# Patient Record
Sex: MALE | Race: WHITE | Employment: UNEMPLOYED | URBAN - NONMETROPOLITAN AREA
[De-identification: names, ages, dates, MRNs, and addresses within clinical notes are randomized per-mention and may not be internally consistent; named-entity substitution may affect disease eponyms.]

---
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
## 2019-10-15 ENCOUNTER — HOSPITAL ENCOUNTER (EMERGENCY)
Age: 19
Discharge: HOME | End: 2019-10-15
Attending: EMERGENCY MEDICINE
Payer: COMMERCIAL

## 2019-10-15 ENCOUNTER — APPOINTMENT (OUTPATIENT)
Dept: GENERAL RADIOLOGY | Age: 19
End: 2019-10-15
Payer: COMMERCIAL

## 2019-10-15 VITALS
HEART RATE: 75 BPM | BODY MASS INDEX: 21.47 KG/M2 | RESPIRATION RATE: 15 BRPM | OXYGEN SATURATION: 99 % | TEMPERATURE: 98.3 F | HEIGHT: 70 IN | DIASTOLIC BLOOD PRESSURE: 80 MMHG | SYSTOLIC BLOOD PRESSURE: 123 MMHG | WEIGHT: 150 LBS

## 2019-10-15 DIAGNOSIS — Z72.0 TOBACCO ABUSE: ICD-10-CM

## 2019-10-15 DIAGNOSIS — I30.9 ACUTE PERICARDITIS, UNSPECIFIED TYPE: Primary | ICD-10-CM

## 2019-10-15 DIAGNOSIS — R05.9 COUGH: ICD-10-CM

## 2019-10-15 LAB
ALBUMIN SERPL-MCNC: 4.1 GM/DL (ref 3.4–5)
ALP BLD-CCNC: 73 IU/L (ref 40–129)
ALT SERPL-CCNC: 11 U/L (ref 10–40)
ANION GAP SERPL CALCULATED.3IONS-SCNC: 5 MMOL/L (ref 4–16)
AST SERPL-CCNC: 16 IU/L (ref 15–37)
BASOPHILS ABSOLUTE: 0.1 K/CU MM
BASOPHILS RELATIVE PERCENT: 0.4 % (ref 0–1)
BILIRUB SERPL-MCNC: 0.4 MG/DL (ref 0–1)
BUN BLDV-MCNC: 9 MG/DL (ref 6–23)
CALCIUM SERPL-MCNC: 8.4 MG/DL (ref 8.3–10.6)
CHLORIDE BLD-SCNC: 102 MMOL/L (ref 99–110)
CO2: 28 MMOL/L (ref 21–32)
CREAT SERPL-MCNC: 0.9 MG/DL (ref 0.9–1.3)
DIFFERENTIAL TYPE: ABNORMAL
EOSINOPHILS ABSOLUTE: 0.1 K/CU MM
EOSINOPHILS RELATIVE PERCENT: 0.9 % (ref 0–3)
GFR AFRICAN AMERICAN: >60 ML/MIN/1.73M2
GFR NON-AFRICAN AMERICAN: >60 ML/MIN/1.73M2
GLUCOSE BLD-MCNC: 111 MG/DL (ref 70–99)
HCT VFR BLD CALC: 48.6 % (ref 42–52)
HEMOGLOBIN: 15.9 GM/DL (ref 13.5–18)
IMMATURE NEUTROPHIL %: 0.4 % (ref 0–0.43)
LYMPHOCYTES ABSOLUTE: 1.5 K/CU MM
LYMPHOCYTES RELATIVE PERCENT: 10.8 % (ref 24–44)
MCH RBC QN AUTO: 28.2 PG (ref 27–31)
MCHC RBC AUTO-ENTMCNC: 32.7 % (ref 32–36)
MCV RBC AUTO: 86.3 FL (ref 78–100)
MONOCYTES ABSOLUTE: 1 K/CU MM
MONOCYTES RELATIVE PERCENT: 7.1 % (ref 0–4)
NUCLEATED RBC %: 0 %
PDW BLD-RTO: 12.1 % (ref 11.7–14.9)
PLATELET # BLD: 229 K/CU MM (ref 140–440)
PMV BLD AUTO: 10 FL (ref 7.5–11.1)
POTASSIUM SERPL-SCNC: 4 MMOL/L (ref 3.5–5.1)
RBC # BLD: 5.63 M/CU MM (ref 4.6–6.2)
SEGMENTED NEUTROPHILS ABSOLUTE COUNT: 11.3 K/CU MM
SEGMENTED NEUTROPHILS RELATIVE PERCENT: 80.4 % (ref 36–66)
SODIUM BLD-SCNC: 135 MMOL/L (ref 135–145)
TOTAL IMMATURE NEUTOROPHIL: 0.06 K/CU MM
TOTAL NUCLEATED RBC: 0 K/CU MM
TOTAL PROTEIN: 6.8 GM/DL (ref 6.4–8.2)
TROPONIN T: <0.01 NG/ML
WBC # BLD: 14 K/CU MM (ref 4–10.5)

## 2019-10-15 PROCEDURE — 93010 ELECTROCARDIOGRAM REPORT: CPT | Performed by: INTERNAL MEDICINE

## 2019-10-15 PROCEDURE — 84484 ASSAY OF TROPONIN QUANT: CPT

## 2019-10-15 PROCEDURE — 80053 COMPREHEN METABOLIC PANEL: CPT

## 2019-10-15 PROCEDURE — 6370000000 HC RX 637 (ALT 250 FOR IP): Performed by: EMERGENCY MEDICINE

## 2019-10-15 PROCEDURE — 85025 COMPLETE CBC W/AUTO DIFF WBC: CPT

## 2019-10-15 PROCEDURE — 93005 ELECTROCARDIOGRAM TRACING: CPT | Performed by: PHYSICIAN ASSISTANT

## 2019-10-15 PROCEDURE — 99285 EMERGENCY DEPT VISIT HI MDM: CPT

## 2019-10-15 PROCEDURE — 71045 X-RAY EXAM CHEST 1 VIEW: CPT

## 2019-10-15 PROCEDURE — 36415 COLL VENOUS BLD VENIPUNCTURE: CPT

## 2019-10-15 RX ORDER — IBUPROFEN 600 MG/1
600 TABLET ORAL ONCE
Status: COMPLETED | OUTPATIENT
Start: 2019-10-15 | End: 2019-10-15

## 2019-10-15 RX ORDER — IBUPROFEN 600 MG/1
600 TABLET ORAL EVERY 6 HOURS PRN
Qty: 30 TABLET | Refills: 0 | Status: SHIPPED | OUTPATIENT
Start: 2019-10-15

## 2019-10-15 RX ORDER — INDOMETHACIN 50 MG/1
50 CAPSULE ORAL 3 TIMES DAILY
Qty: 42 CAPSULE | Refills: 0 | Status: SHIPPED | OUTPATIENT
Start: 2019-10-15 | End: 2019-10-29

## 2019-10-15 RX ORDER — GUAIFENESIN 100 MG/5ML
200 SOLUTION ORAL ONCE
Status: COMPLETED | OUTPATIENT
Start: 2019-10-15 | End: 2019-10-15

## 2019-10-15 RX ADMIN — GUAIFENESIN 200 MG: 200 SOLUTION ORAL at 09:24

## 2019-10-15 RX ADMIN — IBUPROFEN 600 MG: 600 TABLET ORAL at 09:24

## 2019-10-15 ASSESSMENT — PAIN DESCRIPTION - PAIN TYPE: TYPE: ACUTE PAIN

## 2019-10-15 ASSESSMENT — PAIN DESCRIPTION - LOCATION: LOCATION: CHEST

## 2019-10-15 ASSESSMENT — PAIN DESCRIPTION - FREQUENCY: FREQUENCY: INTERMITTENT

## 2019-10-15 ASSESSMENT — PAIN DESCRIPTION - DESCRIPTORS: DESCRIPTORS: ACHING

## 2019-10-15 ASSESSMENT — PAIN SCALES - GENERAL: PAINLEVEL_OUTOF10: 9

## 2019-10-17 LAB
EKG ATRIAL RATE: 67 BPM
EKG DIAGNOSIS: NORMAL
EKG P AXIS: 54 DEGREES
EKG P-R INTERVAL: 140 MS
EKG Q-T INTERVAL: 370 MS
EKG QRS DURATION: 98 MS
EKG QTC CALCULATION (BAZETT): 390 MS
EKG R AXIS: 92 DEGREES
EKG T AXIS: 72 DEGREES
EKG VENTRICULAR RATE: 67 BPM

## 2020-02-20 ENCOUNTER — HOSPITAL ENCOUNTER (OUTPATIENT)
Dept: PSYCHIATRY | Age: 20
Setting detail: THERAPIES SERIES
Discharge: HOME | End: 2020-02-20
Payer: COMMERCIAL

## 2020-02-20 PROCEDURE — 90791 PSYCH DIAGNOSTIC EVALUATION: CPT

## 2020-02-20 PROCEDURE — 80305 DRUG TEST PRSMV DIR OPT OBS: CPT

## 2020-02-20 NOTE — PROGRESS NOTES
[address] [place] [address] Urinalysis Laboratory Testing and Medical History      Location: [x] [place] [] Nasreen Jin MD., [id number], Medical Director of [facility] SURGICAL [facility] Director orders for [address] [place] [address] clinical therapists to collect an urine sample from:      Client: Gordon Schwab   : 2000   Case# 1      Urine sample will be collected following the collections guidelines provided on [facility] [place]) custody form, and completion of the [facility] Non-Federal chain of custody drug screening form. During the course of treatment, randomly a urine sample will be collected, at a minimum of one time a month, more frequently as needed, as part of the clinical outpatient alcohol and drug treatment program at [address] [place] [address]. Physical Examination Recommended:  [x] NO           [] YES    Summary of Medical History  Prior to Admission medications    Medication Sig Start Date End Date Taking? Authorizing Provider   indomethacin (INDOCIN) 50 MG capsule Take 1 capsule by mouth 3 times daily for 14 days 10/15/19 10/29/19  Roxanna Jeronimo PA-C   ibuprofen (ADVIL;MOTRIN) 600 MG tablet Take 1 tablet by mouth every 6 hours as needed for Pain 10/15/19   Roxanna Jeronimo PA-C     Past Surgical History:   Procedure Laterality Date    HAND SURGERY      LEG SURGERY       History reviewed. No pertinent past medical history. There are no active problems to display for this patient.       GUILLERMO Simpson-FEDERICO  3638/6:76 PM

## 2020-02-20 NOTE — PROGRESS NOTES
[facility] TREATMENT PLAN    Location: [x] [place] [] Francis Nova    Treatment plan: Initial    Strengths: employed, work ethic      Weakness/Limitations: using on probation and interacting with using peers       Service/Frequency/Duration: Individual Session x 1 time weekly x 90 days, Standard Outpatient Group x 1 time weekly x 90 days, Urinalysis one time monthly x 90 days and one time weekly x 90 days A.A /  NA meetings     Diagnosis: F14.10 Nondependent cocaine abuse-unspecified use    Level of Care: 1 Outpatient Services    1. Problem: History of Substance use     a. Goal: Enhance personalized knowledge and insight associated with mood altering substances x 90 days   b. Objectives:     i. 1) Remind self of detrimental consequences in major life areas regarding substance  use in 90 days Evaluation Date: 5/20/2020 Code: C Continue TBD    ii. 2) Identify 4 to 8 benefits and gratitudes due to remaining substance free in 90 days: Evaluation Date: 05/20/2020 Code: C Continue TBD      iii. 3) Identify 4 relapse triggers, define relapse, difference between internal and eternal triggers associated with substance use in 90 days and Evaluation Date: 05/20/2020 Code: C Continue TBD      2. Problem: Involved in Legal System     a. Goal: Finalize and complete required stipulations and requirements for court in 90 days     b. Objectives:     i. 1) Participate actively in updated progress notes, urine screens for legal system in 90 days:  Evaluation Date: 05/20/2020 Code: C Continue TBD     ii. 2) Attend required meetings, court fines and other ramifications of probation, court in 90 days  Evaluation Date: 05/20/2020 Code: C Continue TBD     iii. 3)  Utilize, if needed case management services provided by [facility] to enhance abstaining from substance use Evaluation Date: 05/20/2020 Code: C Continue TBD       3. Problem: History of Relapse x 90 days   a.  Goal: Identify and address the core dynamics and dilemmas that are perpetuating consequences and exacerbate relapses and triggers and in 90 days       b. Objectives:   i. 1)  Identify and address 4 to 8 internal triggers and 4 to 8 external  triggers associated with substance  use in 90 days Evaluation Date: 05/20/2020 Code: C Continue TBD      ii. 2) Enhance 4 to 8 healthy techniques and coping skills, relapse prevention x 90 days  Evaluation Date: 05/20/2020 Code: C Continue TBD        Defer: Address legal stipulations       Discharge Plan/Instructions: Demonstrate constructive motivation to successfully complete outpatient treatment, finalize stipulations for probation and legal system and develop healthy sobriety plan. Alberto Guzmán / 2000 has participated in the treatment plan development outlined above on 2/20/2020.          Fransico Maciel [facility] Center[facility]ACQUES-CS  1/68/6970/5:84 PM

## 2020-02-27 ENCOUNTER — HOSPITAL ENCOUNTER (OUTPATIENT)
Dept: PSYCHIATRY | Age: 20
Setting detail: THERAPIES SERIES
Discharge: HOME | End: 2020-02-27
Payer: COMMERCIAL

## 2020-02-27 PROCEDURE — 90834 PSYTX W PT 45 MINUTES: CPT

## 2020-02-27 ASSESSMENT — LIFESTYLE VARIABLES: HISTORY_ALCOHOL_USE: NO

## 2020-02-27 NOTE — PROGRESS NOTES
612 [facility]        Individual  Progress Note    Location: [x] [place] [] [place]                   Patient Name: Ashley Schwartz   : 2000     Case # :  [id number]  Therapist: Gerry Merrill        Objective/Service/Time:      S: Gem Fernandez is a 20year old  male, no children, never , stipulated to complete substance abuse assessment through legal system due to continuous substance use of marijuana while on probation. O: Denies any homicidal and suicidal ideation: denies any psychosis, oriented x 4         A: Reviewed urine drug screen, completed remaining  psychosocial assessment, complete progress note and other pertinent and vital documentation essential for client to engage services. P: Establish adequate level of care and recommendations: consist of Individual sessions and outpatient group.          Electronically signed by Gerry Merrill on 2020 at 1:38 PM     Dayana Fenton, [facility], LICDC-CS

## 2020-03-03 ENCOUNTER — HOSPITAL ENCOUNTER (OUTPATIENT)
Dept: PSYCHIATRY | Age: 20
Setting detail: THERAPIES SERIES
Discharge: HOME | End: 2020-03-03
Payer: COMMERCIAL

## 2020-03-03 NOTE — GROUP NOTE
[facility] Group Therapy Note      3/3/2020    Location:  [place]    Clients Presents: 3622, 0233, 5851, 4680    Clients Absent:  2006, 4602    Length of session: 1.5 hours    Group Note: OP    Group Type: Co-Ed    New members were welcomed and introduced. Norms and expectations of group were discussed. Content:  Identifying and addressing signs and symptoms of toxic, abusive and dysfunctional relationships associated with internal and external triggers.        PETRA Ortega  9/2/1460 4:56 PM        Co-Therapist: N/A      [facility] Individual Group Progress Note    Chris Valentine  2000  3/3/2020    Notes on Client Progress in Group        Did not show for group session         PETRA Ortega  5/2/9540 4:41 PM         Co-Therapist: N/A

## 2020-03-05 ENCOUNTER — HOSPITAL ENCOUNTER (OUTPATIENT)
Dept: PSYCHIATRY | Age: 20
Setting detail: THERAPIES SERIES
Discharge: HOME | End: 2020-03-05
Payer: COMMERCIAL

## 2020-03-05 PROCEDURE — 90834 PSYTX W PT 45 MINUTES: CPT

## 2020-03-05 PROCEDURE — 80305 DRUG TEST PRSMV DIR OPT OBS: CPT

## 2020-03-05 NOTE — PROGRESS NOTES
[facility]RE[facility]    Location: [x] [place] [] [facility]    Treatment plan: Initial    Strengths: employed, work ethic      Weakness/Limitations: using on probation and interacting with using peers       Service/Frequency/Duration: Individual Session x 1 time weekly x 90 days, Standard Outpatient Group x 1 time weekly x 90 days, Urinalysis one time monthly x 90 days and one time weekly x 90 days A.A /  NA meetings     Diagnosis: F14.10 Nondependent cocaine abuse-unspecified use    Level of Care: 1 Outpatient Services    1. Problem: History of Substance use     a. Goal: Enhance personalized knowledge and insight associated with mood altering substances x 90 days   b. Objectives:     i. 1) Remind self of detrimental consequences in major life areas regarding substance  use in 90 days Evaluation Date: 5/20/2020 Code: Achieved reviewed major life areas and consequences. ii. 2) Identify 4 to 8 benefits and gratitudes due to remaining substance free in 90 days: Evaluation Date: 05/20/2020 Code: C Continue TBD      iii. 3) Identify 4 relapse triggers, define relapse, difference between internal and eternal triggers associated with substance use in 90 days and Evaluation Date: 05/20/2020 Code: C Continue TBD      2. Problem: Involved in Legal System     a. Goal: Finalize and complete required stipulations and requirements for court in 90 days     b. Objectives:     i. 1) Participate actively in updated progress notes, urine screens for legal system in 90 days:  Evaluation Date: 05/20/2020 Code: C Continue TBD     ii. 2) Attend required meetings, court fines and other ramifications of probation, court in 90 days  Evaluation Date: 05/20/2020 Code: C Continue TBD     iii. 3)  Utilize, if needed case management services provided by [facility] to enhance abstaining from substance use Evaluation Date: 05/20/2020 Code: C Continue TBD       3. Problem: History of Relapse x 90 days   a.  Goal: Identify and

## 2020-03-10 ENCOUNTER — HOSPITAL ENCOUNTER (OUTPATIENT)
Dept: PSYCHIATRY | Age: 20
Setting detail: THERAPIES SERIES
Discharge: HOME | End: 2020-03-10
Payer: COMMERCIAL

## 2020-03-10 PROCEDURE — 90853 GROUP PSYCHOTHERAPY: CPT

## 2020-03-10 NOTE — GROUP NOTE
612 [facility] Group Therapy Note      3/10/2020    Location:  [place]    Clients Presents: [address], [address] [facility], [facility] [address], [address] [facility], 0905, 9568    Clients Absent: 3390, 8595    Length of session: 1.5 hours    Group Note: OP    Group Type: Co-Ed    New members were welcomed and introduced. Norms and expectations of group were discussed. Content: Understanding the Disease Concept : progression of Use, tolerance, impact of substance use on major life areas; secondary consequences associated with substance use. PETRA Holt  4/39/5605 4:65 PM        Co-Therapist: N/A      [facility] Individual Group Progress Note    Eliseo Elam  2000  3/10/2020    Notes on Client Progress in [address] [place] [address] attended group, introduced himself and events leading to his arrival, discussed he has returned to treatment due to constantly smoking marijuana daily, currently involved in legal and financial consequences.         PETRA Holt  4/10/8654 9:69 PM          Co-Therapist: N/A

## 2020-03-12 ENCOUNTER — HOSPITAL ENCOUNTER (OUTPATIENT)
Dept: PSYCHIATRY | Age: 20
Setting detail: THERAPIES SERIES
Discharge: HOME | End: 2020-03-12
Payer: COMMERCIAL

## 2020-03-12 PROCEDURE — 90834 PSYTX W PT 45 MINUTES: CPT

## 2020-03-17 ENCOUNTER — APPOINTMENT (OUTPATIENT)
Dept: PSYCHIATRY | Age: 20
End: 2020-03-17
Payer: COMMERCIAL

## 2020-03-19 ENCOUNTER — HOSPITAL ENCOUNTER (OUTPATIENT)
Dept: PSYCHIATRY | Age: 20
Setting detail: THERAPIES SERIES
Discharge: HOME | End: 2020-03-19
Payer: COMMERCIAL

## 2020-03-19 PROCEDURE — 90834 PSYTX W PT 45 MINUTES: CPT

## 2020-03-19 NOTE — PROGRESS NOTES
[address] [facility]        Individual  Progress Note    Location: [x] [place] [] Justine [facility]abel                   Patient Name: Scot Collins   : 2000     Case # :  [id number]  Therapist: Myriam Leon        Objective/Service/Time:      Goal # 1     Objectives  # 3        S: Jose Guevara is a 20year old  male, no children, never , stipulated to complete substance abuse assessment through legal system due to continuous substance use of marijuana while on probation.         O:  oriented x 4            A: Processed relapse triggers, primarily associated with using peers, reviewed importance of mixed messages, adequate boundaries: reviewed thoughts and behaviros prior to use: currently motivated legally.                   P: Plan continue services         Electronically signed by Myriam Leon on 3/19/2020 at 11:55 AM     REINA Lara, VONW, LICDC-CS

## 2020-03-24 ENCOUNTER — APPOINTMENT (OUTPATIENT)
Dept: PSYCHIATRY | Age: 20
End: 2020-03-24
Payer: COMMERCIAL

## 2020-03-26 ENCOUNTER — HOSPITAL ENCOUNTER (OUTPATIENT)
Dept: PSYCHIATRY | Age: 20
Setting detail: THERAPIES SERIES
Discharge: HOME | End: 2020-03-26
Payer: COMMERCIAL

## 2020-03-26 PROCEDURE — 90834 PSYTX W PT 45 MINUTES: CPT

## 2020-03-26 NOTE — PROGRESS NOTES
612 [facility]        Individual  Progress Note    Location: [x] [place] [] [place]                   Patient Name: Jonane Patton   : 2000     Case # :  [id number]  Therapist: Bee Mccain        Objective/Service/Time:    Tele health     1. Reviewed with client to state their name and the last 4 numbers of his/her SS#   2. Reviewed with client  if they are in a confidential location where other people cannot hear the content of the counseling session, discuss the importance of confidentiality. 3.  Asked clients permission to conduct treatment individual counseling session via telehealth. 4.  Let the client know if you are disconnected that each one should try to call back until you make contact again. 5. They are to receive the crisis hotline phone numbers, they are located in the, Information You Need to Know packet, last page, and on their appointment cards for clients that were already active with [facility] prior.        Goal # 1  Objective # 3    S: Sera Conteh is a 20year old  male, no children, never , stipulated to complete substance abuse assessment through legal system due to continuous substance use of marijuana while on probation.      Discussed adjustment and acclimation of abstaining from substance use, identifying triggers and relapse.        O:  oriented x 4             A: Processed meaning of  relapse, difference between internal and external triggers, challenge mind when think of not using the rest of life, instead of one day at a time: majority of peers use, mainly more focused on working           P: Plan continue services        Electronically signed by Bee Mccain on 3/26/2020 at 5:09 PM       Dayana Alfonso 84, Autumn Westfall, [facility]-CS

## 2020-03-26 NOTE — PROGRESS NOTES
[facility]                     CLINICAL DIAGNOSIS SUMMARY    Location: [x] [place] [] [place]                   Patient Name: Rhonda Alfaro   : 2000     Case # :  [id number]  Therapist: Elvis Funez      1. Identifying information:  Rhonda Alfaro / 2000           Emery Alas is a 20year old Caucasians male, no children, legally stipulated to complete drug and alcohol assessment. 2.  Substance use history: Onset of smoking marijuana age 14, note while involved with [address] [place] [address] in , obtained positive urine for amphetamine on 2018, deluded urine 2018, in fact no human urine and urine on 2018 he falsified results using as he report his peers : report by age 12, primarily daily smoking, last use 2020, age 12 to 19 primarily daily use, several and multiple times per day. 3. Consequences of substance use: (personal, inter-personal, legal, occupational, medical, nutritional,       Leisure, spiritual, etc.)           Interpersonal majority of peers use, using activities and legal probation         4. Co-existing problems;  (mental health, psychiatric, previous treatment programs, family       Problems, social, educational, etc.)      Previous treatment at [address] [place] [address] and estranged relationship with father: alcoholic       5. Treatment needs, barriers to treatment, impact of disease on life: On probation, license suspended         Summary of Medical History:  Prior to Admission medications    Medication Sig Start Date End Date Taking?  Authorizing Provider   indomethacin (INDOCIN) 50 MG capsule Take 1 capsule by mouth 3 times daily for 14 days 10/15/19 10/29/19  Mirtha Nelson PA-C   ibuprofen (ADVIL;MOTRIN) 600 MG tablet Take 1 tablet by mouth every 6 hours as needed for Pain 10/15/19   Mirtha Nelson PA-C     Past Surgical History:   Procedure Laterality Date   

## 2020-03-26 NOTE — PROGRESS NOTES
[facility] TREATMENT [facility]    Location: [x] [place] [] [place]    Treatment plan: Initial    Strengths: employed, work ethic      Weakness/Limitations: using on probation and interacting with using peers       Service/Frequency/Duration: Individual Session x 1 time weekly x 90 days, Standard Outpatient Group x 1 time weekly x 90 days, Urinalysis one time monthly x 90 days and one time weekly x 90 days A.A /  NA meetings     Diagnosis: F14.10 Nondependent cocaine abuse-unspecified use    Level of Care: 1 Outpatient Services    1. Problem: History of Substance use     a. Goal: Enhance personalized knowledge and insight associated with mood altering substances x 90 days   b. Objectives:     i. 1) Remind self of detrimental consequences in major life areas regarding substance  use in 90 days Evaluation Date: 5/20/2020 Code: Achieved reviewed major life areas and consequences. ii. 2) Identify 4 to 8 benefits and gratitudes due to remaining substance free in 90 days: Evaluation Date: 05/20/2020 Code: C Continue TBD      iii. 3) Identify 4 relapse triggers, define relapse, difference between internal and eternal triggers associated with substance use in 90 days and Evaluation Date: 05/20/2020 Code: Achieved 3-26-20: reviewed cycle of relapsed and knowledge of relapse. 2.    Problem: Involved in Legal System     a. Goal: Finalize and complete required stipulations and requirements for court in 90 days     b. Objectives:     i. 1) Participate actively in updated progress notes, urine screens for legal system in 90 days:  Evaluation Date: 05/20/2020 Code: C Continue TBD     ii. 2) Attend required meetings, court fines and other ramifications of probation, court in 90 days  Evaluation Date: 05/20/2020 Code: C Continue TBD     iii. 3)  Utilize, if needed case management services provided by [facility] to enhance abstaining from substance use Evaluation Date: 05/20/2020 Code: C Continue TBD       3.     Problem:

## 2020-03-31 ENCOUNTER — APPOINTMENT (OUTPATIENT)
Dept: PSYCHIATRY | Age: 20
End: 2020-03-31
Payer: COMMERCIAL

## 2020-04-02 ENCOUNTER — HOSPITAL ENCOUNTER (OUTPATIENT)
Dept: PSYCHIATRY | Age: 20
Setting detail: THERAPIES SERIES
Discharge: HOME | End: 2020-04-02
Payer: COMMERCIAL

## 2020-04-02 PROCEDURE — 90832 PSYTX W PT 30 MINUTES: CPT

## 2020-04-02 NOTE — PROGRESS NOTES
[facility] TREATMENT [facility]    Location: [x] [place] [] [place]    Treatment plan: Initial    Strengths: employed, work ethic      Weakness/Limitations: using on probation and interacting with using peers       Service/Frequency/Duration: Individual Session x 1 time weekly x 90 days, Standard Outpatient Group x 1 time weekly x 90 days, Urinalysis one time monthly x 90 days and one time weekly x 90 days A.A /  NA meetings     Diagnosis: F14.10 Nondependent cocaine abuse-unspecified use    Level of Care: 1 Outpatient Services    1. Problem: History of Substance use     a. Goal: Enhance personalized knowledge and insight associated with mood altering substances x 90 days   b. Objectives:     i. 1) Remind self of detrimental consequences in major life areas regarding substance  use in 90 days Evaluation Date: 5/20/2020 Code: Achieved reviewed major life areas and consequences. ii. 2) Identify 4 to 8 benefits and gratitudes due to remaining substance free in 90 days: Evaluation Date: 05/20/2020 Code: Achieved 4-2-20: recognizing benefits of not smoking marijuana.       iii. 3) Identify 4 relapse triggers, define relapse, difference between internal and eternal triggers associated with substance use in 90 days and Evaluation Date: 05/20/2020 Code: Achieved 3-26-20: reviewed cycle of relapsed and knowledge of relapse. 2.    Problem: Involved in Legal System     a. Goal: Finalize and complete required stipulations and requirements for court in 90 days     b. Objectives:     i. 1) Participate actively in updated progress notes, urine screens for legal system in 90 days:  Evaluation Date: 05/20/2020 Code: C Continue TBD     ii. 2) Attend required meetings, court fines and other ramifications of probation, court in 90 days  Evaluation Date: 05/20/2020 Code: C Continue TBD     iii.  3)  Utilize, if needed case management services provided by [facility] to enhance abstaining from substance use Evaluation Date:

## 2020-04-09 ENCOUNTER — HOSPITAL ENCOUNTER (OUTPATIENT)
Dept: PSYCHIATRY | Age: 20
Setting detail: THERAPIES SERIES
Discharge: HOME | End: 2020-04-09
Payer: COMMERCIAL

## 2020-04-09 PROCEDURE — 90832 PSYTX W PT 30 MINUTES: CPT

## 2020-04-09 NOTE — PROGRESS NOTES
[facility] TREATMENT [facility]    Location: [x] [place] [] Rick Adame    Treatment plan: Initial    Strengths: employed, work ethic      Weakness/Limitations: using on probation and interacting with using peers       Service/Frequency/Duration: Individual Session x 1 time weekly x 90 days, Standard Outpatient Group x 1 time weekly x 90 days, Urinalysis one time monthly x 90 days and one time weekly x 90 days A.A /  NA meetings     Diagnosis: F14.10 Nondependent cocaine abuse-unspecified use    Level of Care: 1 Outpatient Services    1. Problem: History of Substance use     a. Goal: Enhance personalized knowledge and insight associated with mood altering substances x 90 days   b. Objectives:     i. 1) Remind self of detrimental consequences in major life areas regarding substance  use in 90 days Evaluation Date: 5/20/2020 Code: Achieved reviewed major life areas and consequences. April 9, 2020: achieved: reviewed difference between abuse and dependence. ii. 2) Identify 4 to 8 benefits and gratitudes due to remaining substance free in 90 days: Evaluation Date: 05/20/2020 Code: Achieved 4-2-20: recognizing benefits of not smoking marijuana.       iii. 3) Identify 4 relapse triggers, define relapse, difference between internal and eternal triggers associated with substance use in 90 days and Evaluation Date: 05/20/2020 Code: Achieved 3-26-20: reviewed cycle of relapsed and knowledge of relapse. 2.    Problem: Involved in Legal System     a. Goal: Finalize and complete required stipulations and requirements for court in 90 days     b. Objectives:     i. 1) Participate actively in updated progress notes, urine screens for legal system in 90 days:  Evaluation Date: 05/20/2020 Code: C Continue TBD     ii. 2) Attend required meetings, court fines and other ramifications of probation, court in 90 days  Evaluation Date: 05/20/2020 Code: C Continue TBD     iii.  3)  Utilize, if needed case management services provided by [facility] to enhance abstaining from substance use Evaluation Date: 05/20/2020 Code: C Continue TBD       3. Problem: History of Relapse x 90 days   a. Goal: Identify and address the core dynamics and dilemmas that are perpetuating consequences and exacerbate relapses and triggers and in 90 days       b. Objectives:   i. 1)  Identify and address 4 to 8 internal triggers and 4 to 8 external  triggers associated with substance  use in 90 days Evaluation Date: 05/20/2020 Code: C Continue TBD      ii. 2) Enhance 4 to 8 healthy techniques and coping skills, relapse prevention x 90 days  Evaluation Date: 05/20/2020 Code: C Continue TBD        Defer: Address legal stipulations       Discharge Plan/Instructions: Demonstrate constructive motivation to successfully complete outpatient treatment, finalize stipulations for probation and legal system and develop healthy sobriety plan. Salley Epley / 2000 has participated in the treatment plan development outlined above on 4/9/2020.          GUILLERMO Willams-FEDERICO  1/8/7636/3:96 PM

## 2020-04-09 NOTE — PROGRESS NOTES
612 [facility]        Individual  Progress Note    Location: [x] [place] [] [place]                   Patient Name: Dwain Pérez   : 2000     Case # :  [id number]  Therapist: Brock Aiken        Objective/Service/Time:        Goal # 1      Objective  #  1        Tele health         1.  Reviewed with client to state their name and the last 4 numbers of his/her SS#   2.  Reviewed with client  if they are in a confidential location where other people cannot hear the content of the counseling session, discuss the importance of confidentiality. 3.  Asked clients permission to conduct treatment individual counseling session via telehealth. 4.  Let the client know if you are disconnected that each one should try to call back until you make contact again.    5. Received the crisis hotline phone number, they are located in the, Information You Need to Know packet, last page, and on their appointment cards for clients that were already active with [facility] prior.            S: Marlee Barrera is a 20year old  male, no children, never , stipulated to complete substance abuse assessment through legal system due to continuous substance use of marijuana while on probation.      Ursula Jaquez indicated he remains sober, motivated, discussed difference between abuse and dependence.         O:  oriented x 4             A: Processed information of difference in abuse and dependence : reviewed impact of substance use and impact of substance use on major life areas        P: Plan continue services         Electronically signed by Brock Aiken on 2020 at 3:11 PM     Dayana Nathan, LSW, LICDC-CS

## 2020-04-16 ENCOUNTER — HOSPITAL ENCOUNTER (OUTPATIENT)
Dept: PSYCHIATRY | Age: 20
Setting detail: THERAPIES SERIES
Discharge: HOME | End: 2020-04-16
Payer: COMMERCIAL

## 2020-04-16 PROCEDURE — 90834 PSYTX W PT 45 MINUTES: CPT

## 2020-04-16 NOTE — PROGRESS NOTES
[facility] TREATMENT PLAN    Location: [x] [place] [] [facility]    Treatment plan: Initial    Strengths: employed, work ethic      Weakness/Limitations: using on probation and interacting with using peers       Service/Frequency/Duration: Individual Session x 1 time weekly x 90 days, Standard Outpatient Group x 1 time weekly x 90 days, Urinalysis one time monthly x 90 days and one time weekly x 90 days A.A /  NA meetings     Diagnosis: F14.10 Nondependent cocaine abuse-unspecified use    Level of Care: 1 Outpatient Services    1. Problem: History of Substance use     a. Goal: Enhance personalized knowledge and insight associated with mood altering substances x 90 days   b. Objectives:     i. 1) Remind self of detrimental consequences in major life areas regarding substance  use in 90 days Evaluation Date: 5/20/2020 Code: Achieved reviewed major life areas and consequences. April 9, 2020: achieved: reviewed difference between abuse and dependence. ii. 2) Identify 4 to 8 benefits and gratitudes due to remaining substance free in 90 days: Evaluation Date: 05/20/2020 Code: Achieved 4-2-20: recognizing benefits of not smoking marijuana.       iii. 3) Identify 4 relapse triggers, define relapse, difference between internal and eternal triggers associated with substance use in 90 days and Evaluation Date: 05/20/2020 Code: Achieved 3-26-20: reviewed cycle of relapsed and knowledge of relapse. 2.    Problem: Involved in Legal System     a. Goal: Finalize and complete required stipulations and requirements for court in 90 days     b. Objectives:     i. 1) Participate actively in updated progress notes, urine screens for legal system in 90 days:  Evaluation Date: 05/20/2020 Code: C Continue TBD     ii. 2) Attend required meetings, court fines and other ramifications of probation, court in 90 days  Evaluation Date: 05/20/2020 Code: C Continue TBD     iii.  3)  Utilize, if needed case management services provided by

## 2020-04-23 ENCOUNTER — HOSPITAL ENCOUNTER (OUTPATIENT)
Dept: PSYCHIATRY | Age: 20
Setting detail: THERAPIES SERIES
Discharge: HOME | End: 2020-04-23
Payer: COMMERCIAL

## 2020-04-23 PROCEDURE — 90832 PSYTX W PT 30 MINUTES: CPT

## 2020-04-23 NOTE — PROGRESS NOTES
612         Individual  Progress Note    Location: [x] [place] [] [facility]                   Patient Name: Tyler Bergman   : 2000     Case # :   [id number]  Therapist: Kirsten Arriola    Objective/Service/Time:          Goal # 1      Objective  #  3        Tele health         1.  Reviewed with client to state their name and the last 4 numbers of his/her SS#   2.  Reviewed with client  if they are in a confidential location where other people cannot hear the content of the counseling session, discuss the importance of confidentiality. 3.  Asked clients permission to conduct treatment individual counseling session via telehealth. 4.  Let the client know if you are disconnected that each one should try to call back until you make contact again.    5. Received the crisis hotline phone number, they are located in the, Information You Need to Know packet, last page, and on their appointment cards for clients that were already active with [facility] prior.            S: Barb Jones is a 20year old  male, no children, never , stipulated to complete substance abuse assessment through legal system due to continuous substance use of marijuana while on probation.      Rosalio Langford indicated he remains sober, seeking additional employment with [employer], addressing understanding of relapse.            O:  oriented x 4                A: Processed information on post Acute Withdrawal and relapse, identifying correlation of relapse            P: Plan continue services         Electronically signed by Kirsten Arriola on 2020 at 12:27 PM       Dayana Larose 83, LSW, LICDC-CS

## 2020-04-30 ENCOUNTER — HOSPITAL ENCOUNTER (OUTPATIENT)
Dept: PSYCHIATRY | Age: 20
Setting detail: THERAPIES SERIES
Discharge: HOME | End: 2020-04-30
Payer: COMMERCIAL

## 2020-04-30 PROCEDURE — 90832 PSYTX W PT 30 MINUTES: CPT

## 2020-04-30 NOTE — PROGRESS NOTES
[facility] TREATMENT PLAN    Location: [x] [place] [] [facility]    Treatment plan: Initial    Strengths: employed, work ethic      Weakness/Limitations: using on probation and interacting with using peers       Service/Frequency/Duration: Individual Session x 1 time weekly x 90 days, Standard Outpatient Group x 1 time weekly x 90 days, Urinalysis one time monthly x 90 days and one time weekly x 90 days A.A /  NA meetings     Diagnosis: F14.10 Nondependent cocaine abuse-unspecified use    Level of Care: 1 Outpatient Services    1. Problem: History of Substance use     a. Goal: Enhance personalized knowledge and insight associated with mood altering substances x 90 days   b. Objectives:     i. 1) Remind self of detrimental consequences in major life areas regarding substance  use in 90 days Evaluation Date: 5/20/2020 Code: Achieved reviewed major life areas and consequences. April 9, 2020: achieved: reviewed difference between abuse and dependence. ii. 2) Identify 4 to 8 benefits and gratitudes due to remaining substance free in 90 days: Evaluation Date: 05/20/2020 Code: Achieved 4-2-20: recognizing benefits of not smoking marijuana.       iii. 3) Identify 4 relapse triggers, define relapse, difference between internal and eternal triggers associated with substance use in 90 days and Evaluation Date: 05/20/2020 Code: Achieved 3-26-20: reviewed cycle of relapsed and knowledge of relapse. Achieved 4-23-20: post acute withdrawal and relapse. Achieved 4-30-20: reviewing peer association and socialized areas. 2.    Problem: Involved in Legal System     a. Goal: Finalize and complete required stipulations and requirements for court in 90 days     b. Objectives:     i. 1) Participate actively in updated progress notes, urine screens for legal system in 90 days:  Evaluation Date: 05/20/2020 Code: C Continue TBD     ii.  2) Attend required meetings, court fines and other ramifications of probation, court in 90 days

## 2020-05-07 ENCOUNTER — HOSPITAL ENCOUNTER (OUTPATIENT)
Dept: PSYCHIATRY | Age: 20
Setting detail: THERAPIES SERIES
Discharge: HOME | End: 2020-05-07
Payer: COMMERCIAL

## 2020-05-07 PROCEDURE — 90832 PSYTX W PT 30 MINUTES: CPT

## 2020-05-07 NOTE — PROGRESS NOTES
[facility]        Individual  Progress Note    Location: [x] [place] [] [facility]                   Patient Name: Falguni Swartz   : 2000     Case # :  [id number]  Therapist: Primo Dumont      Objective/Service/Time:    Goal # 1      Objective  #  1        Tele health         1.  Reviewed with client to state their name and the last 4 numbers of his/her SS#   2.  Reviewed with client  if they are in a confidential location where other people cannot hear the content of the counseling session, discuss the importance of confidentiality. 3.  Asked clients permission to conduct treatment individual counseling session via telehealth. 4.  Let the client know if you are disconnected that each one should try to call back until you make contact again. 5. Received the crisis hotline phone number, they are located in the, Information You Need to Know packet, last page, and on their appointment cards for clients that were already active with [facility] prior.            S: Josué Rueda is a 20year old  male, no children, never , stipulated to complete substance abuse assessment through legal system due to continuous substance use of marijuana while on probation.      Bee Toledo indicated still sober and reviewing previous consequences with previous using peers and using activities.             O:  oriented x 4                A:  reviewed boundaries, social interaction and previous using peers, healthy interactions and intervention strategies to abstain form using               P: Plan continue services         Electronically signed by Primo Dumont on 2020 at 12:41 PM       Yamileth Jones, REINA, LSW, LICDC-CS
ramifications of probation, court in 90 days  Evaluation Date: 05/20/2020 Code: C Continue TBD     iii. 3)  Utilize, if needed case management services provided by [facility] to enhance abstaining from substance use Evaluation Date: 05/20/2020 Code: C Continue TBD       3. Problem: History of Relapse x 90 days   a. Goal: Identify and address the core dynamics and dilemmas that are perpetuating consequences and exacerbate relapses and triggers and in 90 days       b. Objectives:   i. 1)  Identify and address 4 to 8 internal triggers and 4 to 8 external  triggers associated with substance  use in 90 days Evaluation Date: 05/20/2020 Code: Achieved 4-16-20: awareness of triggers. ii. 2) Enhance 4 to 8 healthy techniques and coping skills, relapse prevention x 90 days  Evaluation Date: 05/20/2020 Code: C Continue TBD        Defer: Address legal stipulations       Discharge Plan/Instructions: Demonstrate constructive motivation to successfully complete outpatient treatment, finalize stipulations for probation and legal system and develop healthy sobriety plan. Sherman Vieyra / 2000 has participated in the treatment plan development outlined above on 5/7/2020.          Jose Whitten [facility]  6/8/5864/22:01 PM

## 2020-05-12 ENCOUNTER — HOSPITAL ENCOUNTER (OUTPATIENT)
Dept: PSYCHIATRY | Age: 20
Setting detail: THERAPIES SERIES
Discharge: HOME | End: 2020-05-12
Payer: COMMERCIAL

## 2020-05-14 ENCOUNTER — HOSPITAL ENCOUNTER (OUTPATIENT)
Dept: PSYCHIATRY | Age: 20
Setting detail: THERAPIES SERIES
Discharge: HOME | End: 2020-05-14
Payer: COMMERCIAL

## 2020-05-14 PROCEDURE — 90832 PSYTX W PT 30 MINUTES: CPT

## 2020-05-14 PROCEDURE — 80305 DRUG TEST PRSMV DIR OPT OBS: CPT

## 2020-05-14 NOTE — PROGRESS NOTES
required meetings, court fines and other ramifications of probation, court in 90 days  Evaluation Date: 05/20/2020 Code: C Continue TBD     iii. 3)  Utilize, if needed case management services provided by [facility] to enhance abstaining from substance use Evaluation Date: 05/20/2020 Code: C Continue TBD       3. Problem: History of Relapse x 90 days   a. Goal: Identify and address the core dynamics and dilemmas that are perpetuating consequences and exacerbate relapses and triggers and in 90 days       b. Objectives:   i. 1)  Identify and address 4 to 8 internal triggers and 4 to 8 external  triggers associated with substance  use in 90 days Evaluation Date: 05/20/2020 Code: Achieved 4-16-20: awareness of triggers. ii. 2) Enhance 4 to 8 healthy techniques and coping skills, relapse prevention x 90 days  Evaluation Date: 05/20/2020 Code: C Continue TBD        Defer: Address legal stipulations       Discharge Plan/Instructions: Demonstrate constructive motivation to successfully complete outpatient treatment, finalize stipulations for probation and legal system and develop healthy sobriety plan. Tyler Bergman / 2000 has participated in the treatment plan development outlined above on 5/14/2020.          GUILLERMO Garcia-FEDERICO  8/90/2365/24:35 AM

## 2020-05-19 ENCOUNTER — HOSPITAL ENCOUNTER (OUTPATIENT)
Dept: PSYCHIATRY | Age: 20
Setting detail: THERAPIES SERIES
Discharge: HOME | End: 2020-05-19
Payer: COMMERCIAL

## 2020-05-19 PROCEDURE — 90853 GROUP PSYCHOTHERAPY: CPT

## 2020-05-19 NOTE — GROUP NOTE
[facility] Group Therapy Note      5/19/2020    Location:  [place]    Clients Presents: [id number], [id number], [id number], [id number]    Clients Absent:     Length of session: 1.5 hours    Group Note: OP    Group Type: Co-Ed    New members were welcomed and introduced. Norms and expectations of group were discussed. Content:  Major symptoms of Post Acute Withdrawal: reviewed impact of substance use on short and long term memory, conflict resolution, emotions, nutrition, sleep, psychological and physiological adjustment         PETRA Griffiths  4/39/8171 49:94 PM      Co-Therapist: N/A      [facility] Individual Group Progress Note    Rosaline Williamson  2000 5/19/2020    Notes on Client Progress in [facility] introduced himself and events leading to arrival, presented check in information , discussed longest abstinence from substance use was 6 months, discussed increase in smoking cigarettes to assist with his cravings for marijuana, identified  with sleep associated with use.          PETRA Griffiths  8/30/4454 26:09 PM         Co-Therapist: N/A

## 2020-05-21 ENCOUNTER — HOSPITAL ENCOUNTER (OUTPATIENT)
Dept: PSYCHIATRY | Age: 20
Setting detail: THERAPIES SERIES
Discharge: HOME | End: 2020-05-21
Payer: COMMERCIAL

## 2020-05-21 PROCEDURE — 80305 DRUG TEST PRSMV DIR OPT OBS: CPT

## 2020-05-21 PROCEDURE — 90832 PSYTX W PT 30 MINUTES: CPT

## 2020-05-21 PROCEDURE — 90834 PSYTX W PT 45 MINUTES: CPT

## 2020-05-21 NOTE — PROGRESS NOTES
report. ii. 2) Attend required meetings, court fines and other ramifications of probation, court in 90 days  Evaluation Date: 05/20/2020 Code: C Continue TBD     iii. 3)  Utilize, if needed case management services provided by [facility] to enhance abstaining from substance use Evaluation Date: 05/20/2020 Code: C Continue TBD       3. Problem: History of Relapse x 90 days   a. Goal: Identify and address the core dynamics and dilemmas that are perpetuating consequences and exacerbate relapses and triggers and in 90 days       b. Objectives:   i. 1)  Identify and address 4 to 8 internal triggers and 4 to 8 external  triggers associated with substance  use in 90 days Evaluation Date: 05/20/2020 Code: Achieved 4-16-20: awareness of triggers. ii. 2) Enhance 4 to 8 healthy techniques and coping skills, relapse prevention x 90 days  Evaluation Date: 05/20/2020 Code: Achieved 5-21-20: relapse prevention         Defer: Address legal stipulations       Discharge Plan/Instructions: Demonstrate constructive motivation to successfully complete outpatient treatment, finalize stipulations for probation and legal system and develop healthy sobriety plan. Moy Mckeon / 2000 has participated in the treatment plan development outlined above on 5/21/2020.          Darwin Rodríguez [facility] Hospital[facility]  9/50/0341/78:88 AM

## 2020-05-26 ENCOUNTER — HOSPITAL ENCOUNTER (OUTPATIENT)
Dept: PSYCHIATRY | Age: 20
Setting detail: THERAPIES SERIES
Discharge: HOME | End: 2020-05-26
Payer: COMMERCIAL

## 2020-05-26 PROCEDURE — 90853 GROUP PSYCHOTHERAPY: CPT

## 2020-05-28 ENCOUNTER — HOSPITAL ENCOUNTER (OUTPATIENT)
Dept: PSYCHIATRY | Age: 20
Setting detail: THERAPIES SERIES
Discharge: HOME | End: 2020-05-28
Payer: COMMERCIAL

## 2020-05-28 PROCEDURE — 90832 PSYTX W PT 30 MINUTES: CPT

## 2020-05-28 NOTE — PROGRESS NOTES
[facility] TREATMENT PLAN    Location: [x] [place] [] [facility]    Treatment plan: Initial    Strengths: employed, work ethic      Weakness/Limitations: using on probation and interacting with using peers       Service/Frequency/Duration: Individual Session x 1 time weekly x 90 days, Standard Outpatient Group x 1 time weekly x 90 days, Urinalysis one time monthly x 90 days and one time weekly x 90 days A.A /  NA meetings     Diagnosis: F14.10 Nondependent cocaine abuse-unspecified use    Level of Care: 1 Outpatient Services    1. Problem: History of Substance use     a. Goal: Enhance personalized knowledge and insight associated with mood altering substances x 90 days   b. Objectives:     i. 1) Remind self of detrimental consequences in major life areas regarding substance  use in 90 days Evaluation Date: 5/20/2020 Code: Achieved reviewed major life areas and consequences. April 9, 2020: achieved: reviewed difference between abuse and dependence. Achieved 5-7-20: healthy boundaries     ii. 2) Identify 4 to 8 benefits and gratitudes due to remaining substance free in 90 days: Evaluation Date: 05/20/2020 Code: Achieved 4-2-20: recognizing benefits of not smoking marijuana. Achieved 5-14-20: advantages of being sober. iii. 3) Identify 4 relapse triggers, define relapse, difference between internal and eternal triggers associated with substance use in 90 days and Evaluation Date: 05/20/2020 Code: Achieved 3-26-20: reviewed cycle of relapsed and knowledge of relapse. Achieved 4-23-20: post acute withdrawal and relapse. Achieved 4-30-20: reviewing peer association and socialized areas. 2.    Problem: Involved in Legal System     a. Goal: Finalize and complete required stipulations and requirements for court in 90 days     b. Objectives:     i.  1) Participate actively in updated progress notes, urine screens for legal system in 90 days:  Evaluation Date: 05/20/2020 Code: Achieved 5-21-20: updated progress

## 2021-04-06 ENCOUNTER — HOSPITAL ENCOUNTER (EMERGENCY)
Age: 21
Discharge: PSYCHIATRIC OR SUBSTANCE USE FACILITY | End: 2021-04-07
Attending: EMERGENCY MEDICINE
Payer: COMMERCIAL

## 2021-04-06 DIAGNOSIS — F32.A DEPRESSION WITH SUICIDAL IDEATION: Primary | ICD-10-CM

## 2021-04-06 DIAGNOSIS — R45.851 DEPRESSION WITH SUICIDAL IDEATION: Primary | ICD-10-CM

## 2021-04-06 LAB
ACETAMINOPHEN LEVEL: <5 UG/ML (ref 15–30)
ALBUMIN SERPL-MCNC: 3.9 GM/DL (ref 3.4–5)
ALCOHOL SCREEN SERUM: <0.01 %WT/VOL
ALP BLD-CCNC: 56 IU/L (ref 40–129)
ALT SERPL-CCNC: 11 U/L (ref 10–40)
AMPHETAMINES: NEGATIVE
ANION GAP SERPL CALCULATED.3IONS-SCNC: 8 MMOL/L (ref 4–16)
AST SERPL-CCNC: 15 IU/L (ref 15–37)
BARBITURATE SCREEN URINE: NEGATIVE
BASOPHILS ABSOLUTE: 0.1 K/CU MM
BASOPHILS RELATIVE PERCENT: 0.8 % (ref 0–1)
BENZODIAZEPINE SCREEN, URINE: NEGATIVE
BILIRUB SERPL-MCNC: 0.2 MG/DL (ref 0–1)
BUN BLDV-MCNC: 12 MG/DL (ref 6–23)
CALCIUM SERPL-MCNC: 8.6 MG/DL (ref 8.3–10.6)
CANNABINOID SCREEN URINE: ABNORMAL
CHLORIDE BLD-SCNC: 103 MMOL/L (ref 99–110)
CO2: 27 MMOL/L (ref 21–32)
COCAINE METABOLITE: NEGATIVE
CREAT SERPL-MCNC: 0.9 MG/DL (ref 0.9–1.3)
DIFFERENTIAL TYPE: ABNORMAL
DOSE AMOUNT: ABNORMAL
DOSE AMOUNT: ABNORMAL
DOSE TIME: ABNORMAL
DOSE TIME: ABNORMAL
EOSINOPHILS ABSOLUTE: 0.2 K/CU MM
EOSINOPHILS RELATIVE PERCENT: 2.2 % (ref 0–3)
GFR AFRICAN AMERICAN: >60 ML/MIN/1.73M2
GFR NON-AFRICAN AMERICAN: >60 ML/MIN/1.73M2
GLUCOSE BLD-MCNC: 95 MG/DL (ref 70–99)
HCT VFR BLD CALC: 45.3 % (ref 42–52)
HEMOGLOBIN: 15.8 GM/DL (ref 13.5–18)
IMMATURE NEUTROPHIL %: 0.3 % (ref 0–0.43)
LYMPHOCYTES ABSOLUTE: 1.9 K/CU MM
LYMPHOCYTES RELATIVE PERCENT: 24.2 % (ref 24–44)
MCH RBC QN AUTO: 28.8 PG (ref 27–31)
MCHC RBC AUTO-ENTMCNC: 34.9 % (ref 32–36)
MCV RBC AUTO: 82.7 FL (ref 78–100)
MONOCYTES ABSOLUTE: 0.7 K/CU MM
MONOCYTES RELATIVE PERCENT: 8.5 % (ref 0–4)
NUCLEATED RBC %: 0 %
OPIATES, URINE: NEGATIVE
OXYCODONE: NEGATIVE
PDW BLD-RTO: 11.6 % (ref 11.7–14.9)
PHENCYCLIDINE, URINE: NEGATIVE
PLATELET # BLD: 231 K/CU MM (ref 140–440)
PMV BLD AUTO: 10.4 FL (ref 7.5–11.1)
POTASSIUM SERPL-SCNC: 4 MMOL/L (ref 3.5–5.1)
RBC # BLD: 5.48 M/CU MM (ref 4.6–6.2)
SALICYLATE LEVEL: <0.3 MG/DL (ref 15–30)
SARS-COV-2, NAAT: NOT DETECTED
SEGMENTED NEUTROPHILS ABSOLUTE COUNT: 5 K/CU MM
SEGMENTED NEUTROPHILS RELATIVE PERCENT: 64 % (ref 36–66)
SODIUM BLD-SCNC: 138 MMOL/L (ref 135–145)
SOURCE: NORMAL
TOTAL IMMATURE NEUTOROPHIL: 0.02 K/CU MM
TOTAL NUCLEATED RBC: 0 K/CU MM
TOTAL PROTEIN: 6.3 GM/DL (ref 6.4–8.2)
TSH HIGH SENSITIVITY: 0.99 UIU/ML (ref 0.27–4.2)
WBC # BLD: 7.8 K/CU MM (ref 4–10.5)

## 2021-04-06 PROCEDURE — 85025 COMPLETE CBC W/AUTO DIFF WBC: CPT

## 2021-04-06 PROCEDURE — 80053 COMPREHEN METABOLIC PANEL: CPT

## 2021-04-06 PROCEDURE — 84443 ASSAY THYROID STIM HORMONE: CPT

## 2021-04-06 PROCEDURE — 80307 DRUG TEST PRSMV CHEM ANLYZR: CPT

## 2021-04-06 PROCEDURE — 83721 ASSAY OF BLOOD LIPOPROTEIN: CPT

## 2021-04-06 PROCEDURE — 87635 SARS-COV-2 COVID-19 AMP PRB: CPT

## 2021-04-06 PROCEDURE — 36415 COLL VENOUS BLD VENIPUNCTURE: CPT

## 2021-04-06 PROCEDURE — 99285 EMERGENCY DEPT VISIT HI MDM: CPT

## 2021-04-06 PROCEDURE — G0480 DRUG TEST DEF 1-7 CLASSES: HCPCS

## 2021-04-06 PROCEDURE — 80061 LIPID PANEL: CPT

## 2021-04-06 ASSESSMENT — ENCOUNTER SYMPTOMS
ALLERGIC/IMMUNOLOGIC NEGATIVE: 1
RESPIRATORY NEGATIVE: 1
EYES NEGATIVE: 1
GASTROINTESTINAL NEGATIVE: 1

## 2021-04-06 NOTE — ED NOTES
Pt told this nurse he was not SI or HI. Pt also stated his mother told the police he made those threats, but pt states he did not.        Eliseo Maguire RN  04/06/21 [id number]

## 2021-04-06 NOTE — ED NOTES
Pt arrives to ED via police for sending threatening text messages to his mother. Pt stated he was going to kill himself kill his mother and her boyfriend, kill his dad and princess them.        Lubna Christensen RN  04/06/21 0859

## 2021-04-06 NOTE — ED PROVIDER NOTES
[address] CHIEF COMPLAINT:   Suicidal      [place]:  Mustapha Mack is a 24 y.o. male that presents concern for depression suicide ideation. Patient is here with police, EMS brought in pink slipped by them patient denies any suicide or depression ideation he states he was at home he lives with his grandmother and police showed up and brought him here stating it was protocol. Patient states he does have a history of this but nothing currently. Please state that the mother called them and patient sent text messages that he was depressed suicidal going to kill himself his mother and his mother's boyfriend. Patient denies any statements unclear who is telling the truth he denies any other questions or concerns. REVIEW OF SYSTEMS:  At least 10 systems reviewed and otherwise acutely negative except as in the [address] Review of Systems   Constitutional: Negative. HENT: Negative. Eyes: Negative. Respiratory: Negative. Cardiovascular: Negative. Gastrointestinal: Negative. Endocrine: Negative. Genitourinary: Negative. Musculoskeletal: Negative. Skin: Negative. Allergic/Immunologic: Negative. Neurological: Negative. Hematological: Negative. Psychiatric/Behavioral: Positive for dysphoric mood and suicidal ideas. All other systems reviewed and are negative. No past medical history on file.   Past Surgical History:   Procedure Laterality Date    HAND SURGERY      LEG SURGERY       Family History   Problem Relation Age of Onset    High Blood Pressure Father      Social History     Socioeconomic History    Marital status: Single     Spouse name: Not on file    Number of children: Not on file    Years of education: Not on file    Highest education level: Not on file   Occupational History    Not on file   Social Needs    Financial resource strain: Not on file    Food insecurity     Worry: Not on file     Inability: Not on file   [place]if[place] Riddles Transportation needs     Medical: Not on file     Non-medical: Not on file   Tobacco Use    Smoking status: Current Every Day Smoker     Packs/day: 2.00     Types: Cigarettes    Smokeless tobacco: Never Used   Substance and Sexual Activity    Alcohol use: No    Drug use: Yes     Types: Marijuana    Sexual activity: Not on file   Lifestyle    Physical activity     Days per week: Not on file     Minutes per session: Not on file    Stress: Not on file   Relationships    Social connections     Talks on phone: Not on file     Gets together: Not on file     Attends Yazidism service: Not on file     Active member of club or organization: Not on file     Attends meetings of clubs or organizations: Not on file     Relationship status: Not on file    Intimate partner violence     Fear of current or ex partner: Not on file     Emotionally abused: Not on file     Physically abused: Not on file     Forced sexual activity: Not on file   Other Topics Concern    Not on file   Social History Narrative    ** Merged History Encounter **          No current facility-administered medications for this encounter. Current Outpatient Medications   Medication Sig Dispense Refill    indomethacin (INDOCIN) 50 MG capsule Take 1 capsule by mouth 3 times daily for 14 days 42 capsule 0    ibuprofen (ADVIL;MOTRIN) 600 MG tablet Take 1 tablet by mouth every 6 hours as needed for Pain 30 tablet 0      No Known Allergies  No current facility-administered medications for this encounter.       Current Outpatient Medications   Medication Sig Dispense Refill    indomethacin (INDOCIN) 50 MG capsule Take 1 capsule by mouth 3 times daily for 14 days 42 capsule 0    ibuprofen (ADVIL;MOTRIN) 600 MG tablet Take 1 tablet by mouth every 6 hours as needed for Pain 30 tablet 0       Nursing Notes Reviewed    VITAL SIGNS:  ED Triage Vitals   Enc Vitals Group      BP       Pulse       Resp       Temp       Temp src       SpO2       Weight Height       Head Circumference       Peak Flow       Pain Score       Pain Loc       Pain Edu? Excl. in [address]? PHYSICAL EXAM:  Physical Exam  Vitals signs and nursing note reviewed. Constitutional:       General: He is not in acute distress. Appearance: Normal appearance. He is well-developed and well-groomed. He is not ill-appearing, toxic-appearing or diaphoretic. HENT:      Head: Normocephalic and atraumatic. Right Ear: External ear normal.      Left Ear: External ear normal.      Nose: No congestion or rhinorrhea. Eyes:      General: No scleral icterus. Right eye: No discharge. Left eye: No discharge. Extraocular Movements: Extraocular movements intact. Conjunctiva/sclera: Conjunctivae normal.   Neck:      Musculoskeletal: Full passive range of motion without pain and normal range of motion. Normal range of motion. No edema, erythema, neck rigidity, crepitus, injury, pain with movement or torticollis. Vascular: No JVD. Trachea: Phonation normal.   Cardiovascular:      Rate and Rhythm: Normal rate and regular rhythm. Pulses: Normal pulses. Heart sounds: Normal heart sounds. No murmur. No friction rub. No gallop. Pulmonary:      Effort: Pulmonary effort is normal. No respiratory distress. Breath sounds: Normal breath sounds. No stridor. No wheezing, rhonchi or rales. Abdominal:      General: Bowel sounds are normal. There is no distension. Palpations: Abdomen is soft. There is no mass. Tenderness: There is no abdominal tenderness. There is no guarding or rebound. Negative signs include Boston's sign, Rovsing's sign and McBurney's sign. Hernia: No hernia is present. Musculoskeletal: Normal range of motion. General: No swelling, tenderness, deformity or signs of injury. Right lower leg: No edema. Left lower leg: No edema. Skin:     General: Skin is warm.       Coloration: Skin is not jaundiced or pale.      Findings: No bruising, erythema, lesion or rash. Neurological:      General: No focal deficit present. Mental Status: He is alert and oriented to person, place, and time. GCS: GCS eye subscore is 4. GCS verbal subscore is 5. GCS motor subscore is 6. Cranial Nerves: Cranial nerves are intact. No cranial nerve deficit, dysarthria or facial asymmetry. Sensory: Sensation is intact. No sensory deficit. Motor: Motor function is intact. No weakness, tremor, atrophy, abnormal muscle tone or seizure activity. Coordination: Coordination is intact. Coordination normal.   Psychiatric:         Attention and Perception: Attention and perception normal.         Mood and Affect: Affect normal. Mood is not depressed. Speech: Speech normal.         Behavior: Behavior normal. Behavior is cooperative. Thought Content:  Thought content normal.         Cognition and Memory: Cognition and memory normal.         Judgment: Judgment normal.           I have reviewed andinterpreted all of the currently available lab results from this visit (if applicable):    Results for orders placed or performed during the hospital encounter of 04/06/21   COVID-19, Rapid    Specimen: Nasopharyngeal   Result Value Ref Range    Source THROAT     SARS-CoV-2, NAAT NOT DETECTED NOT DETECTED   CBC Auto Differential   Result Value Ref Range    WBC 7.8 4.0 - 10.5 K/CU MM    RBC 5.48 4.6 - 6.2 M/CU MM    Hemoglobin 15.8 13.5 - 18.0 GM/DL    Hematocrit 45.3 42 - 52 %    MCV 82.7 78 - 100 FL    MCH 28.8 27 - 31 PG    MCHC 34.9 32.0 - 36.0 %    RDW 11.6 (L) 11.7 - 14.9 %    Platelets 868 873 - 686 K/CU MM    MPV 10.4 7.5 - 11.1 FL    Differential Type AUTOMATED DIFFERENTIAL     Segs Relative 64.0 36 - 66 %    Lymphocytes % 24.2 24 - 44 %    Monocytes % 8.5 (H) 0 - 4 %    Eosinophils % 2.2 0 - 3 %    Basophils % 0.8 0 - 1 %    Segs Absolute 5.0 K/CU MM    Lymphocytes Absolute 1.9 K/CU MM    Monocytes Absolute 0.7 K/CU MM    Eosinophils Absolute 0.2 K/CU MM    Basophils Absolute 0.1 K/CU MM    Nucleated RBC % 0.0 %    Total Nucleated RBC 0.0 K/CU MM    Total Immature Neutrophil 0.02 K/CU MM    Immature Neutrophil % 0.3 0 - 0.43 %   CMP   Result Value Ref Range    Sodium 138 135 - 145 MMOL/L    Potassium 4.0 3.5 - 5.1 MMOL/L    Chloride 103 99 - 110 mMol/L    CO2 27 21 - 32 MMOL/L    BUN 12 6 - 23 MG/DL    CREATININE 0.9 0.9 - 1.3 MG/DL    Glucose 95 70 - 99 MG/DL    Calcium 8.6 8.3 - 10.6 MG/DL    Albumin 3.9 3.4 - 5.0 GM/DL    Total Protein 6.3 (L) 6.4 - 8.2 GM/DL    Total Bilirubin 0.2 0.0 - 1.0 MG/DL    ALT 11 10 - 40 U/L    AST 15 15 - 37 IU/L    Alkaline Phosphatase 56 40 - 129 IU/L    GFR Non-African American >60 >60 mL/min/1.73m2    GFR African American >60 >60 mL/min/1.73m2    Anion Gap 8 4 - 16   TSH without Reflex   Result Value Ref Range    TSH, High Sensitivity 0.988 0.270 - 6.49 uIu/ml   Salicylate Level   Result Value Ref Range    Salicylate Lvl <0.7 (L) 15 - 30 MG/DL    DOSE AMOUNT DOSE AMT. GIVEN - UNKNOWN     DOSE TIME DOSE TIME GIVEN - UNKNOWN    Acetaminophen Level   Result Value Ref Range    Acetaminophen Level <5.0 (L) 15 - 30 ug/ml    DOSE AMOUNT DOSE AMT. GIVEN - UNKNOWN     DOSE TIME DOSE TIME GIVEN - UNKNOWN    ETOH Blood   Result Value Ref Range    Alcohol Scrn <0.01 <0.01 %WT/VOL        Radiographs (if obtained):  [] The following radiograph was interpreted by myself in the absence of a radiologist:  [x] Radiologist's Report Reviewed:      EKG (if obtained): (All EKG's are interpreted by myself in the absence of a cardiologist)    MDM:    Patient here with concern for depression, suicide ideation. Again patient states he was at home today minding his own business when police showed up and brought him here per protocol. Patient denies any depression suicide ideation or plan or homicidal ideation. He states he does have a history of this but nothing recently. He lives with his grandmother. Apparently mother called police and stated that the patient sent text which is that he was going to kill himself his mother and his mother's boyfriend. Patient is ANO x3 he is actually very pleasant he denies any suicide homicidal ideation but pink slip performed by police is telling a different story and not sure who to believe all before mental, psych work-up consult mental health. Patient otherwise medically cleared will consult mental health pending evaluation I will sign out patient to Dr. Cary Moya. CLINICAL IMPRESSION:  Final diagnoses:   Depression with suicidal ideation       (Please note that portions of this note may have been completed with a voice recognition program. Efforts were made to edit the dictations but occasionally words aremis-transcribed.)    DISPOSITION REFERRAL (if applicable):  No follow-up provider specified.     DISPOSITION MEDICATIONS (if applicable):  New Prescriptions    No medications on file          Liberty Peña, [facility],   04/06/21 2238

## 2021-04-07 VITALS
RESPIRATION RATE: 16 BRPM | HEART RATE: 75 BPM | SYSTOLIC BLOOD PRESSURE: 122 MMHG | DIASTOLIC BLOOD PRESSURE: 85 MMHG | OXYGEN SATURATION: 98 % | TEMPERATURE: 98.5 F

## 2021-04-07 NOTE — ED NOTES
MED TRANS ETA  [facility] [address] [place] [address]  04/07/21 1016

## 2021-04-07 NOTE — ED NOTES
Rounding of the patient was done and the patient was awake lying in the bed. The patient constant observer is at bedside and has no concerns of patient safety. Every 15 minute visual checks continued.      Abisai Muñiz RN  04/07/21 0766

## 2021-04-07 NOTE — ED NOTES
Spoke with grandmother with pt's permission, grandmother reported that pt has had a hard life, reported she is not afraid of pt, stated pt has a hx of sending messages to his mother threatening her, her boyfriend and his dad, stated pt's mother and father have nothing to do with him and are always degrading pt, reported pt is a good kid and had to learn to be independent at a young age, reported that pt does make poor choices at times, grandmother reported pt has not spoke to his girlfriend in a few weeks, stated she thinks he lost his job due to the  Change in times, reported pt is safe to live with her but that she does think he needs help, reported pt's father has bipolar, reported pt has been assessed multiple times and attempts to get pt to take medication were unsuccessful, stated she does not fear pt but that she is not sure what he may do to others     Dylon Barry RN  04/06/21 [address] [facility] [address]

## 2021-04-07 NOTE — ED NOTES
Pt is sitting in bed at this time. No distress is noted, sitter is at the bedside.      Thermon Moritz, RN  04/06/21 2676 10-Chun-2018 05:05

## 2021-04-07 NOTE — ED NOTES
Pt is sitting in bed at this time and denies any needs. No distress is noted. Sitter is at the bedside.       Leah Ko RN  04/07/21 3252

## 2021-04-07 NOTE — ED NOTES
Per Amadeo Ash RN pt has been accepted, nurse to nurse given to Lyssa Chanel [facility] called ETA [facility] , called [facility] and updated on Denny Bryant RN  04/07/21 0692 Lela Rivera

## 2021-04-07 NOTE — ED NOTES
..Provisional Diagnosis:     Suicidal  Homicidal  Appears Manipulative  Probable Mood Swings  Family dysfunction     Psychosocial and Contextual Factors:       Per pink slip from police  \"Sergio Barrientos sent text messages to his mother stating he was going to [employer] himself\" and kill his mother and her boyfriend. Message to mother was quote \"I'm going to kill myself\" I'm fucking killing myself\" Tonia Marsh also stated that he was going to kill his father and princess him also\"    Per Dr Dolores Montanez ED physician  Magdi Peña is a 24 y.o. male that presents concern for depression suicide ideation. Patient is here with police, EMS brought in pink slipped by them patient denies any suicide or depression ideation he states he was at home he lives with his grandmother and police showed up and brought him here stating it was protocol. Patient states he does have a history of this but nothing currently. Please state that the mother called them and patient sent text messages that he was depressed suicidal going to kill himself his mother and his mother's boyfriend. Patient denies any statements unclear who is telling the truth he denies any other questions or concerns. \"    Per [facility] ED RN  \"Pt arrives to ED via police for sending threatening text messages to his mother. Pt stated he was going to kill himself kill his mother and her boyfriend, kill his dad and princess them.  \"    Pt presents anxious, tense and manipulative    Pt denies SI intent or plan    Pt denies HI intent or plan    Pt denies AVH    Pt denies issues with sleep or appetite,     Pt reported he has a job, a girlfriend, looking for an apartment    Pt adamantly is denying he sent his mother any messages, reported that he thinks his mother was upset that he sold two cars and was trying to get money from him, stated that he does not get along with his mother, stated the police came to the house and told him that he needed to go for evaluation,     Spoke with grandmother Shiam Worthy with pt's permission  \"Spoke with grandmother with pt's permission, grandmother reported that pt has had a hard life, reported she is not afraid of pt, stated pt has a hx of sending messages to his mother threatening her, her boyfriend and his dad, stated pt's mother and father have nothing to do with him and are always degrading pt, reported pt is a good kid and had to learn to be independent at a young age, reported that pt does make poor choices at times, grandmother reported pt has not spoke to his girlfriend in a few weeks, stated she thinks he lost his job due to the  Change in times, reported pt is safe to live with her but that she does think he needs help, reported pt's father has bipolar, reported pt has been assessed multiple times and attempts to get pt to take medication were unsuccessful, stated she does not fear pt but that she is not sure what he may do to others\"    After discussing conflicting stories with pt, pt admitted to sending the messages but now stated they were from a few days ago, pt stated grandma doesn't know that he still has his job and that he and girlfriend started talking again, pt demonstrates manipulative behavior now during conversation attempting to accuse others of lying, pt appears to be dishonest and attempting to say what he believes is needed to attempt to discharge    Pt unable to assure safety of self and others/ high risk to harm self or others    Pt demonstrates poor insight/ poor judgement    Discussed all above with Dr Willard Never ED physician who recommends psychiatric care for observation, evaluation and safety    Will seek placement          C-SSRS Summary:      Patient: as above  Family: dad has bipolar  Agency: none      Present Suicidal Behavior:      Verbal: as above per pink slip    Attempt: denies    Past Suicidal Behavior:     Verbal:hx of per pt    Attempt: unknown      Self-Injurious/Self-Mutilation: unknown     Trauma Identified:  Family

## 2021-04-07 NOTE — ED NOTES
This nurse resumes care at this time. Pt is sitting in bed, in the hallway. He denies any needs at this time, and no distress is noted. Sitter is at the bedside.       Josué Cavazos RN  04/06/21 5852

## 2021-04-07 NOTE — ED PROVIDER NOTES
Patient is endorsed to me by Dr. Leonora Byers at [address] [place] [address]. In short, patient presented with suicidal ideation. The patient was placed in suicide precautions, patient's clothing and belongings were removed, documented and stored in the emergency department. Patient was reported to me to be medically cleared and hemodynamically stable awaiting behavioral health evaluation. Currently awaiting input from behavioral health specialist for disposition. Patient will be admitted to [address] [place] [address] Dr. Sue Dozier MD  04/07/21 0701

## 2021-04-07 NOTE — ED NOTES
Bed: ED-23  Expected date:   Expected time:   Means of arrival:   Comments:  Sheri [address] [place] [address]  04/07/21 1884

## 2021-04-07 NOTE — ED NOTES
Lab called at this time for urine drug screen. Lab states he will run it now.       Cesilia Garcia RN  04/06/21 8969

## 2021-04-07 NOTE — ED NOTES
Report given to [employer], copy of chart and pink slip sent, pt belongings given to Tyesha Ledezma RN  04/07/21 6548

## 2021-04-07 NOTE — ED NOTES
Pt is asleep in bed at this time. No distress is noted. Sitter is at the bedside.       Lenora Foote RN  04/07/21 8096

## 2021-04-07 NOTE — ED NOTES
Awake getting agitated because he doesn't have a room and transport  Cant come until 1030 am     [facility]  04/07/21 0504

## 2021-04-07 NOTE — ED NOTES
Pt is sitting in bed at this time. No distress is noted. Sitter is at the bedside.       Phyllis Gama RN  04/07/21 6609

## 2021-04-07 NOTE — ED NOTES
Mother called at this time to talk to [facility] at this time. Mother states she has him a bed waiting at [facility] in [facility].       Aylin Goss RN  04/06/21 2037

## 2021-04-07 NOTE — ED NOTES
Pt is asleep in bed at this time. No distress is noted. Sitter is at the bedside.       Dianna Mcmillan RN  04/07/21 8545

## 2021-04-08 LAB
CHOLESTEROL: 119 MG/DL
HDLC SERPL-MCNC: 42 MG/DL
LDL CHOLESTEROL DIRECT: 55 MG/DL
TRIGL SERPL-MCNC: 86 MG/DL

## 2022-12-24 ENCOUNTER — APPOINTMENT (OUTPATIENT)
Dept: GENERAL RADIOLOGY | Age: 22
End: 2022-12-24
Payer: COMMERCIAL

## 2022-12-24 ENCOUNTER — APPOINTMENT (OUTPATIENT)
Dept: CT IMAGING | Age: 22
End: 2022-12-24
Payer: COMMERCIAL

## 2022-12-24 ENCOUNTER — HOSPITAL ENCOUNTER (EMERGENCY)
Age: 22
Discharge: HOME | End: 2022-12-25
Payer: COMMERCIAL

## 2022-12-24 DIAGNOSIS — B34.0 ADENOVIRUS INFECTION: Primary | ICD-10-CM

## 2022-12-24 DIAGNOSIS — E87.20 LACTIC ACIDOSIS: ICD-10-CM

## 2022-12-24 DIAGNOSIS — E87.6 HYPOKALEMIA: ICD-10-CM

## 2022-12-24 LAB
ADENOVIRUS DETECTION BY PCR: ABNORMAL
ALBUMIN SERPL-MCNC: 4.2 GM/DL (ref 3.4–5)
ALP BLD-CCNC: 84 IU/L (ref 40–129)
ALT SERPL-CCNC: 21 U/L (ref 10–40)
ANION GAP SERPL CALCULATED.3IONS-SCNC: 15 MMOL/L (ref 4–16)
AST SERPL-CCNC: 31 IU/L (ref 15–37)
BASOPHILS ABSOLUTE: 0.1 K/CU MM
BASOPHILS RELATIVE PERCENT: 0.4 % (ref 0–1)
BILIRUB SERPL-MCNC: 0.3 MG/DL (ref 0–1)
BORDETELLA PARAPERTUSSIS BY PCR: NOT DETECTED
BORDETELLA PERTUSSIS PCR: NOT DETECTED
BUN BLDV-MCNC: 23 MG/DL (ref 6–23)
CALCIUM SERPL-MCNC: 9.1 MG/DL (ref 8.3–10.6)
CHLAMYDOPHILA PNEUMONIA PCR: NOT DETECTED
CHLORIDE BLD-SCNC: 92 MMOL/L (ref 99–110)
CO2: 26 MMOL/L (ref 21–32)
CORONAVIRUS 229E PCR: NOT DETECTED
CORONAVIRUS HKU1 PCR: NOT DETECTED
CORONAVIRUS NL63 PCR: NOT DETECTED
CORONAVIRUS OC43 PCR: NOT DETECTED
CREAT SERPL-MCNC: 1 MG/DL (ref 0.9–1.3)
D DIMER: 425 NG/ML(DDU)
DIFFERENTIAL TYPE: ABNORMAL
EOSINOPHILS ABSOLUTE: 0.1 K/CU MM
EOSINOPHILS RELATIVE PERCENT: 0.4 % (ref 0–3)
GFR SERPL CREATININE-BSD FRML MDRD: >60 ML/MIN/1.73M2
GLUCOSE BLD-MCNC: 127 MG/DL (ref 70–99)
HCT VFR BLD CALC: 45.4 % (ref 42–52)
HEMOGLOBIN: 16.3 GM/DL (ref 13.5–18)
HUMAN METAPNEUMOVIRUS PCR: NOT DETECTED
IMMATURE NEUTROPHIL %: 0.7 % (ref 0–0.43)
INFLUENZA A BY PCR: NOT DETECTED
INFLUENZA A H1 (2009) PCR: NOT DETECTED
INFLUENZA A H1 PANDEMIC PCR: NOT DETECTED
INFLUENZA A H3 PCR: NOT DETECTED
INFLUENZA B BY PCR: NOT DETECTED
LACTATE: 2.2 MMOL/L (ref 0.5–1.9)
LYMPHOCYTES ABSOLUTE: 1.8 K/CU MM
LYMPHOCYTES RELATIVE PERCENT: 10.9 % (ref 24–44)
MCH RBC QN AUTO: 29.1 PG (ref 27–31)
MCHC RBC AUTO-ENTMCNC: 35.9 % (ref 32–36)
MCV RBC AUTO: 81.1 FL (ref 78–100)
MONOCYTES ABSOLUTE: 2.2 K/CU MM
MONOCYTES RELATIVE PERCENT: 13.3 % (ref 0–4)
MYCOPLASMA PNEUMONIAE PCR: NOT DETECTED
PARAINFLUENZA 1 PCR: NOT DETECTED
PARAINFLUENZA 2 PCR: NOT DETECTED
PARAINFLUENZA 3 PCR: NOT DETECTED
PARAINFLUENZA 4 PCR: NOT DETECTED
PDW BLD-RTO: 11.4 % (ref 11.7–14.9)
PLATELET # BLD: 255 K/CU MM (ref 140–440)
PMV BLD AUTO: 10.3 FL (ref 7.5–11.1)
POTASSIUM SERPL-SCNC: 2.8 MMOL/L (ref 3.5–5.1)
RBC # BLD: 5.6 M/CU MM (ref 4.6–6.2)
RHINOVIRUS ENTEROVIRUS PCR: NOT DETECTED
RSV PCR: NOT DETECTED
SARS-COV-2: NOT DETECTED
SEGMENTED NEUTROPHILS ABSOLUTE COUNT: 12.4 K/CU MM
SEGMENTED NEUTROPHILS RELATIVE PERCENT: 74.3 % (ref 36–66)
SODIUM BLD-SCNC: 133 MMOL/L (ref 135–145)
TOTAL IMMATURE NEUTOROPHIL: 0.12 K/CU MM
TOTAL PROTEIN: 7.3 GM/DL (ref 6.4–8.2)
TROPONIN T: <0.01 NG/ML
WBC # BLD: 16.7 K/CU MM (ref 4–10.5)

## 2022-12-24 PROCEDURE — 96366 THER/PROPH/DIAG IV INF ADDON: CPT

## 2022-12-24 PROCEDURE — 84484 ASSAY OF TROPONIN QUANT: CPT

## 2022-12-24 PROCEDURE — 0202U NFCT DS 22 TRGT SARS-COV-2: CPT

## 2022-12-24 PROCEDURE — 6370000000 HC RX 637 (ALT 250 FOR IP): Performed by: PHYSICIAN ASSISTANT

## 2022-12-24 PROCEDURE — 96365 THER/PROPH/DIAG IV INF INIT: CPT

## 2022-12-24 PROCEDURE — 6360000002 HC RX W HCPCS: Performed by: PHYSICIAN ASSISTANT

## 2022-12-24 PROCEDURE — 96375 TX/PRO/DX INJ NEW DRUG ADDON: CPT

## 2022-12-24 PROCEDURE — 80053 COMPREHEN METABOLIC PANEL: CPT

## 2022-12-24 PROCEDURE — 85025 COMPLETE CBC W/AUTO DIFF WBC: CPT

## 2022-12-24 PROCEDURE — 94640 AIRWAY INHALATION TREATMENT: CPT

## 2022-12-24 PROCEDURE — 83605 ASSAY OF LACTIC ACID: CPT

## 2022-12-24 PROCEDURE — 93005 ELECTROCARDIOGRAM TRACING: CPT | Performed by: PHYSICIAN ASSISTANT

## 2022-12-24 PROCEDURE — 99285 EMERGENCY DEPT VISIT HI MDM: CPT

## 2022-12-24 PROCEDURE — 6360000004 HC RX CONTRAST MEDICATION: Performed by: PHYSICIAN ASSISTANT

## 2022-12-24 PROCEDURE — 71045 X-RAY EXAM CHEST 1 VIEW: CPT

## 2022-12-24 PROCEDURE — 85379 FIBRIN DEGRADATION QUANT: CPT

## 2022-12-24 PROCEDURE — 71275 CT ANGIOGRAPHY CHEST: CPT

## 2022-12-24 PROCEDURE — 2580000003 HC RX 258: Performed by: PHYSICIAN ASSISTANT

## 2022-12-24 RX ORDER — POTASSIUM CHLORIDE 7.45 MG/ML
10 INJECTION INTRAVENOUS
Status: DISPENSED | OUTPATIENT
Start: 2022-12-24 | End: 2022-12-25

## 2022-12-24 RX ORDER — POTASSIUM CHLORIDE 750 MG/1
40 TABLET, FILM COATED, EXTENDED RELEASE ORAL ONCE
Status: COMPLETED | OUTPATIENT
Start: 2022-12-24 | End: 2022-12-24

## 2022-12-24 RX ORDER — 0.9 % SODIUM CHLORIDE 0.9 %
1000 INTRAVENOUS SOLUTION INTRAVENOUS ONCE
Status: COMPLETED | OUTPATIENT
Start: 2022-12-24 | End: 2022-12-25

## 2022-12-24 RX ORDER — METHYLPREDNISOLONE SODIUM SUCCINATE 125 MG/2ML
125 INJECTION, POWDER, LYOPHILIZED, FOR SOLUTION INTRAMUSCULAR; INTRAVENOUS ONCE
Status: COMPLETED | OUTPATIENT
Start: 2022-12-24 | End: 2022-12-24

## 2022-12-24 RX ORDER — IPRATROPIUM BROMIDE AND ALBUTEROL SULFATE 2.5; .5 MG/3ML; MG/3ML
1 SOLUTION RESPIRATORY (INHALATION) ONCE
Status: COMPLETED | OUTPATIENT
Start: 2022-12-24 | End: 2022-12-24

## 2022-12-24 RX ADMIN — IPRATROPIUM BROMIDE AND ALBUTEROL SULFATE 1 AMPULE: 2.5; .5 SOLUTION RESPIRATORY (INHALATION) at 21:43

## 2022-12-24 RX ADMIN — POTASSIUM CHLORIDE 10 MEQ: 7.45 INJECTION INTRAVENOUS at 23:25

## 2022-12-24 RX ADMIN — POTASSIUM CHLORIDE 40 MEQ: 750 TABLET, FILM COATED, EXTENDED RELEASE ORAL at 23:07

## 2022-12-24 RX ADMIN — SODIUM CHLORIDE 1000 ML: 9 INJECTION, SOLUTION INTRAVENOUS at 23:22

## 2022-12-24 RX ADMIN — IOPAMIDOL 75 ML: 755 INJECTION, SOLUTION INTRAVENOUS at 23:00

## 2022-12-24 RX ADMIN — METHYLPREDNISOLONE SODIUM SUCCINATE 125 MG: 125 INJECTION, POWDER, FOR SOLUTION INTRAMUSCULAR; INTRAVENOUS at 21:27

## 2022-12-25 VITALS
HEART RATE: 91 BPM | BODY MASS INDEX: 21 KG/M2 | HEIGHT: 71 IN | DIASTOLIC BLOOD PRESSURE: 85 MMHG | TEMPERATURE: 97.8 F | RESPIRATION RATE: 17 BRPM | WEIGHT: 150 LBS | OXYGEN SATURATION: 95 % | SYSTOLIC BLOOD PRESSURE: 134 MMHG

## 2022-12-25 LAB — LACTATE: 4.1 MMOL/L (ref 0.5–1.9)

## 2022-12-25 PROCEDURE — 6360000002 HC RX W HCPCS: Performed by: PHYSICIAN ASSISTANT

## 2022-12-25 PROCEDURE — 83605 ASSAY OF LACTIC ACID: CPT

## 2022-12-25 RX ORDER — ALBUTEROL SULFATE 90 UG/1
2 AEROSOL, METERED RESPIRATORY (INHALATION) EVERY 6 HOURS PRN
Qty: 18 G | Refills: 0 | Status: SHIPPED | OUTPATIENT
Start: 2022-12-25

## 2022-12-25 RX ORDER — METHYLPREDNISOLONE 4 MG/1
TABLET ORAL
Qty: 1 KIT | Refills: 0 | Status: SHIPPED | OUTPATIENT
Start: 2022-12-25 | End: 2022-12-31

## 2022-12-25 RX ORDER — POTASSIUM CHLORIDE 20 MEQ/1
20 TABLET, EXTENDED RELEASE ORAL 2 TIMES DAILY
Qty: 10 TABLET | Refills: 0 | Status: SHIPPED | OUTPATIENT
Start: 2022-12-25 | End: 2022-12-30

## 2022-12-25 RX ADMIN — POTASSIUM CHLORIDE 10 MEQ: 7.45 INJECTION INTRAVENOUS at 00:01

## 2022-12-25 NOTE — ED PROVIDER NOTES
[facility] [address] EMERGENCY DEPARTMENT    Patient: Sade Ferrer  MRN: [id number]  : 2000  Date of Evaluation: 2022  ED Provider: Jimmie Nathan PA-C    Chief Complaint       Chief Complaint   Patient presents with    Cough     Coughing up blood, states he was seen at [facility] earlier and they told him to come here. Facial Swelling     Left eye swelling       MIA Ferrer is a 20 y.o. male who presents to the emergency department for cough, body aches, fevers, left eye swelling. Onset was 4 days ago. Constant, worsening. Cough is productive of brown sputum with streaks of bright red blood. Associated SOB. Left eye swelling began today. Denies any associated pain or itching. Denies redness. Denies drainage. Denies visual changes. He has n/v/d. States he was seen at the [facility] ED yesterday--had negative COVID, flu, strep testing. ROS     CONSTITUTIONAL:  + fever, body aches. EYES:  Denies visual changes. + eye swelling. HEAD:  Denies headache. ENT:  Denies earache, nasal congestion, sore throat. NECK:  Denies neck pain. RESPIRATORY:  Denies any shortness of breath.  + cough, hemoptysis. CARDIOVASCULAR:  Denies chest pain. GI:  + n/v/d.    :  Denies urinary symptoms. MUSCULOSKELETAL:  Denies extremity pain or swelling. BACK:  Denies back pain. INTEGUMENT:  Denies skin changes. LYMPHATIC:  Denies lymphadenopathy. NEUROLOGIC:  Denies any numbness/tingling. Past History   History reviewed. No pertinent past medical history.   Past Surgical History:   Procedure Laterality Date    HAND SURGERY      LEG SURGERY       Social History     Socioeconomic History    Marital status: Single     Spouse name: None    Number of children: None    Years of education: None    Highest education level: None   Tobacco Use    Smoking status: Every Day     Packs/day: 2.00     Types: Cigarettes Smokeless tobacco: Never   Substance and Sexual Activity    Alcohol use: No    Drug use: Yes     Types: Marijuana Deadra Fracisco)   Social History Narrative    ** Merged History Encounter **            Medications/Allergies     Discharge Medication List as of 12/25/2022  2:43 AM        CONTINUE these medications which have NOT CHANGED    Details   indomethacin (INDOCIN) 50 MG capsule Take 1 capsule by mouth 3 times daily for 14 days, Disp-42 capsule, R-0Print      ibuprofen (ADVIL;MOTRIN) 600 MG tablet Take 1 tablet by mouth every 6 hours as needed for Pain, Disp-30 tablet, R-0Print           No Known Allergies     Physical Exam       ED Triage Vitals [12/24/22 2057]   BP Temp Temp Source Heart Rate Resp SpO2 Height Weight   (!) 140/87 97.8 °F (36.6 °C) Oral 92 19 97 % 5' 11\" (1.803 m) 150 lb (68 kg)     GENERAL APPEARANCE:  Well-developed, well-nourished, no acute distress. HEAD:  NC/AT. EYES:  PERRL, EOMI. Conjunctiva normal.  No drainage. Left infraorbital edema/blepharitis. No hordeolum or chalazion noted. ENT:  Ears, nose, mouth normal.     NECK:  Supple. CARDIO:  RRR. LUNGS:   CTAB. Respirations unlabored. Occasional cough. ABDOMEN:  Soft, non-distended, non-tender. BS active. BACK:  No midline thoracic or lumbar spinal tenderness. EXTREMITIES:  No acute deformities. SKIN:  Warm and dry. NEUROLOGICAL:  Alert and oriented. PSYCHIATRIC:  Normal mood.      Diagnostics     Labs:  Results for orders placed or performed during the hospital encounter of 12/24/22   Respiratory Panel, Molecular, with COVID-19 (Restricted: peds pts or suitable admitted adults)    Specimen: Nasopharyngeal   Result Value Ref Range    Adenovirus Detection by PCR DETECTED BY PCR (A) NOT DETECTED    Coronavirus 229E PCR NOT DETECTED NOT DETECTED    Coronavirus HKU1 PCR NOT DETECTED NOT DETECTED    Coronavirus NL63 PCR NOT DETECTED NOT DETECTED    Coronavirus OC43 PCR NOT DETECTED NOT DETECTED    SARS-CoV-2 NOT DETECTED NOT DETECTED    Human Metapneumovirus PCR NOT DETECTED NOT DETECTED    Rhinovirus Enterovirus PCR NOT DETECTED NOT DETECTED    Influenza A by PCR NOT DETECTED NOT DETECTED    Influenza A H1 Pandemic PCR NOT DETECTED NOT DETECTED    Influenza A H1 (2009) PCR NOT DETECTED NOT DETECTED    Influenza A H3 PCR NOT DETECTED NOT DETECTED    Influenza B by PCR NOT DETECTED NOT DETECTED    Parainfluenza 1 PCR NOT DETECTED NOT DETECTED    Parainfluenza 2 PCR NOT DETECTED NOT DETECTED    Parainfluenza 3 PCR NOT DETECTED NOT DETECTED    Parainfluenza 4 PCR NOT DETECTED NOT DETECTED    RSV PCR NOT DETECTED NOT DETECTED    Bordetella parapertussis by PCR NOT DETECTED NOT DETECTED    B Pertussis by PCR NOT DETECTED NOT DETECTED    Chlamydophila Pneumonia PCR NOT DETECTED NOT DETECTED    Mycoplasma pneumo by PCR NOT DETECTED NOT DETECTED   CMP   Result Value Ref Range    Sodium 133 (L) 135 - 145 MMOL/L    Potassium 2.8 (LL) 3.5 - 5.1 MMOL/L    Chloride 92 (L) 99 - 110 mMol/L    CO2 26 21 - 32 MMOL/L    BUN 23 6 - 23 MG/DL    Creatinine 1.0 0.9 - 1.3 MG/DL    Est, Glom Filt Rate >60 >60 mL/min/1.73m2    Glucose 127 (H) 70 - 99 MG/DL    Calcium 9.1 8.3 - 10.6 MG/DL    Albumin 4.2 3.4 - 5.0 GM/DL    Total Protein 7.3 6.4 - 8.2 GM/DL    Total Bilirubin 0.3 0.0 - 1.0 MG/DL    ALT 21 10 - 40 U/L    AST 31 15 - 37 IU/L    Alkaline Phosphatase 84 40 - 129 IU/L    Anion Gap 15 4 - 16   CBC with Auto Differential   Result Value Ref Range    WBC 16.7 (H) 4.0 - 10.5 K/CU MM    RBC 5.60 4.6 - 6.2 M/CU MM    Hemoglobin 16.3 13.5 - 18.0 GM/DL    Hematocrit 45.4 42 - 52 %    MCV 81.1 78 - 100 FL    MCH 29.1 27 - 31 PG    MCHC 35.9 32.0 - 36.0 %    RDW 11.4 (L) 11.7 - 14.9 %    Platelets 415 831 - 604 K/CU MM    MPV 10.3 7.5 - 11.1 FL    Immature Neutrophil % 0.7 (H) 0 - 0.43 %    Segs Relative 74.3 (H) 36 - 66 %    Eosinophils % 0.4 0 - 3 %    Basophils % 0.4 0 - 1 %    Lymphocytes % 10.9 (L) 24 - 44 %    Monocytes % 13.3 (H) 0 - 4 %    Total Immature Neutrophil 0.12 K/CU MM    Segs Absolute 12.4 K/CU MM    Eosinophils Absolute 0.1 K/CU MM    Basophils Absolute 0.1 K/CU MM    Lymphocytes Absolute 1.8 K/CU MM    Monocytes Absolute 2.2 K/CU MM    Differential Type AUTOMATED DIFFERENTIAL    Lactic Acid   Result Value Ref Range    Lactate 2.2 (HH) 0.5 - 1.9 mMOL/L   Troponin   Result Value Ref Range    Troponin T <0.010 <0.01 NG/ML   D-Dimer, Quantitative   Result Value Ref Range    D-Dimer, Quant 425 (H) <230 NG/mL(DDU)   Lactic Acid   Result Value Ref Range    Lactate 4.1 (HH) 0.5 - 1.9 mMOL/L   EKG 12 Lead   Result Value Ref Range    Ventricular Rate 85 BPM    Atrial Rate 85 BPM    P-R Interval 142 ms    QRS Duration 94 ms    Q-T Interval 364 ms    QTc Calculation (Bazett) 433 ms    P Axis 72 degrees    R Axis 85 degrees    T Axis 71 degrees    Diagnosis       Normal sinus rhythm  Possible Left atrial enlargement  Left ventricular hypertrophy  Abnormal ECG  When compared with ECG of 15-OCT-2019 10:14,  ST less elevated in Lateral leads  T wave amplitude has decreased in Inferior leads  T wave inversion now evident in Anterior leads         Radiographs:  XR CHEST PORTABLE    Result Date: 12/24/2022  EXAMINATION: ONE XRAY VIEW OF THE CHEST 12/24/2022 9:20 pm COMPARISON: 10/15/2019 HISTORY: ORDERING SYSTEM PROVIDED HISTORY: cough TECHNOLOGIST PROVIDED HISTORY: Reason for exam:->cough Reason for Exam: cough FINDINGS: The lungs are clear. The cardiac and mediastinal contours are normal.  There is no pleural effusion or pneumothorax. No acute osseous abnormality is identified. No acute cardiopulmonary abnormality. CTA PULMONARY W CONTRAST    1. No evidence for a pulmonary embolism. 2. Minimal nodular opacities are seen within the right lower lobe, concerning for infectious bronchiolitis. Procedures/EKG:   Please see attending physician's note for interpretation. ED Course and MDM   -  Patient seen and evaluated in the emergency department.   -  Triage and nursing notes reviewed and incorporated. -  Old chart records reviewed and incorporated. -  Supervising physician was Dr. Wiley Be. Patient was seen independently. -  Work-up included:  see above  -  ED medications:  NS, Duoneb, Solumedrol, K replacement  -  Results discussed with patient. He has a leukocytosis of 16,700 with lactic 2.2.  K is 2.8--given both PO and IV replacement in the department. D-dimer elevated so CTA chest performed which shows no PE, but consistent with bronchiolitis. Resp panel positive for adenovirus. Patient reports feeling much better on re-examination and states ready to go home to enjoy [place]. Unfortunately, his repeat lactic after fluids was 4.4--I did advise patient in this situation we recommend admission for further hydration and repeat but patient declines. I do suspect this may be a bad specimen as does not fit with patient presentation. Continue PO hydration at home and given strict return precautions. He voiced understanding and agreement with plan of care/informed discharge home. -  Disposition:  Home    In light of current events, I did utilize appropriate PPE (including N95 and surgical face mask, safety glasses, and gloves, as recommended by the health facility/national standard best practice, during my bedside interactions with the patient. Final Impression      1. Adenovirus infection    2. Hypokalemia    3.  Lactic acidosis          DISPOSITION Decision To Discharge 12/25/2022 02:39:21 AM      Eric Powell PA-C   [facility]        [place], [place]  12/25/22 5823

## 2022-12-25 NOTE — ED PROVIDER NOTES
12 lead EKG per my interpretation:  Normal Sinus Rhythm 85  Axis is   Normal  QTc is   433  There is no specific T wave changes appreciated. There is no specific ST wave changes appreciated.     Prior EKG to compare with was not available        Jean Parker DO  12/24/22 5227

## 2022-12-26 LAB
EKG ATRIAL RATE: 85 BPM
EKG DIAGNOSIS: NORMAL
EKG P AXIS: 72 DEGREES
EKG P-R INTERVAL: 142 MS
EKG Q-T INTERVAL: 364 MS
EKG QRS DURATION: 94 MS
EKG QTC CALCULATION (BAZETT): 433 MS
EKG R AXIS: 85 DEGREES
EKG T AXIS: 71 DEGREES
EKG VENTRICULAR RATE: 85 BPM

## 2022-12-26 PROCEDURE — 93010 ELECTROCARDIOGRAM REPORT: CPT | Performed by: INTERNAL MEDICINE

## 2024-09-30 ENCOUNTER — APPOINTMENT (OUTPATIENT)
Dept: CT IMAGING | Age: 24
End: 2024-09-30
Payer: COMMERCIAL

## 2024-09-30 ENCOUNTER — APPOINTMENT (OUTPATIENT)
Dept: GENERAL RADIOLOGY | Age: 24
End: 2024-09-30
Payer: COMMERCIAL

## 2024-09-30 ENCOUNTER — HOSPITAL ENCOUNTER (EMERGENCY)
Age: 24
Discharge: HOME | End: 2024-09-30
Payer: COMMERCIAL

## 2024-09-30 VITALS
SYSTOLIC BLOOD PRESSURE: 121 MMHG | TEMPERATURE: 98.4 F | OXYGEN SATURATION: 95 % | DIASTOLIC BLOOD PRESSURE: 81 MMHG | RESPIRATION RATE: 16 BRPM | HEART RATE: 67 BPM

## 2024-09-30 DIAGNOSIS — R07.81 PLEURITIC CHEST PAIN: Primary | ICD-10-CM

## 2024-09-30 LAB
ALBUMIN SERPL-MCNC: 4.4 G/DL (ref 3.4–5)
ALBUMIN/GLOB SERPL: 2.1 {RATIO} (ref 1.1–2.2)
ALP SERPL-CCNC: 58 U/L (ref 40–129)
ALT SERPL-CCNC: 14 U/L (ref 10–40)
ANION GAP SERPL CALCULATED.3IONS-SCNC: 12 MMOL/L (ref 9–17)
AST SERPL-CCNC: 19 U/L (ref 15–37)
BASOPHILS # BLD: 0.04 K/UL
BASOPHILS NFR BLD: 1 % (ref 0–1)
BILIRUB SERPL-MCNC: 0.4 MG/DL (ref 0–1)
BUN SERPL-MCNC: 12 MG/DL (ref 7–20)
CALCIUM SERPL-MCNC: 9.1 MG/DL (ref 8.3–10.6)
CHLORIDE SERPL-SCNC: 104 MMOL/L (ref 99–110)
CO2 SERPL-SCNC: 23 MMOL/L (ref 21–32)
CREAT SERPL-MCNC: 0.9 MG/DL (ref 0.9–1.3)
D DIMER PPP FEU-MCNC: 0.52 UG/ML FEU (ref 0–0.46)
EKG ATRIAL RATE: 68 BPM
EKG DIAGNOSIS: NORMAL
EKG P AXIS: 49 DEGREES
EKG P-R INTERVAL: 134 MS
EKG Q-T INTERVAL: 376 MS
EKG QRS DURATION: 90 MS
EKG QTC CALCULATION (BAZETT): 399 MS
EKG R AXIS: 86 DEGREES
EKG T AXIS: 71 DEGREES
EKG VENTRICULAR RATE: 68 BPM
EOSINOPHIL # BLD: 0.12 K/UL
EOSINOPHILS RELATIVE PERCENT: 2 % (ref 0–3)
ERYTHROCYTE [DISTWIDTH] IN BLOOD BY AUTOMATED COUNT: 11.7 % (ref 11.7–14.9)
GFR, ESTIMATED: >90 ML/MIN/1.73M2
GLUCOSE SERPL-MCNC: 91 MG/DL (ref 74–99)
HCT VFR BLD AUTO: 47.7 % (ref 42–52)
HGB BLD-MCNC: 16.8 G/DL (ref 13.5–18)
IMM GRANULOCYTES # BLD AUTO: 0.01 K/UL
IMM GRANULOCYTES NFR BLD: 0 %
LYMPHOCYTES NFR BLD: 1.77 K/UL
LYMPHOCYTES RELATIVE PERCENT: 29 % (ref 24–44)
MCH RBC QN AUTO: 29.1 PG (ref 27–31)
MCHC RBC AUTO-ENTMCNC: 35.2 G/DL (ref 32–36)
MCV RBC AUTO: 82.7 FL (ref 78–100)
MONOCYTES NFR BLD: 0.43 K/UL
MONOCYTES NFR BLD: 7 % (ref 0–4)
NEUTROPHILS NFR BLD: 61 % (ref 36–66)
NEUTS SEG NFR BLD: 3.78 K/UL
PLATELET # BLD AUTO: 244 K/UL (ref 140–440)
PMV BLD AUTO: 10.4 FL (ref 7.5–11.1)
POTASSIUM SERPL-SCNC: 4 MMOL/L (ref 3.5–5.1)
PROT SERPL-MCNC: 6.5 G/DL (ref 6.4–8.2)
RBC # BLD AUTO: 5.77 M/UL (ref 4.6–6.2)
SODIUM SERPL-SCNC: 140 MMOL/L (ref 136–145)
TROPONIN I SERPL HS-MCNC: <6 NG/L (ref 0–22)
TROPONIN I SERPL HS-MCNC: <6 NG/L (ref 0–22)
WBC OTHER # BLD: 6.2 K/UL (ref 4–10.5)

## 2024-09-30 PROCEDURE — 85025 COMPLETE CBC W/AUTO DIFF WBC: CPT

## 2024-09-30 PROCEDURE — 84484 ASSAY OF TROPONIN QUANT: CPT

## 2024-09-30 PROCEDURE — 6370000000 HC RX 637 (ALT 250 FOR IP): Performed by: PHYSICIAN ASSISTANT

## 2024-09-30 PROCEDURE — 93010 ELECTROCARDIOGRAM REPORT: CPT | Performed by: INTERNAL MEDICINE

## 2024-09-30 PROCEDURE — 6360000004 HC RX CONTRAST MEDICATION: Performed by: PHYSICIAN ASSISTANT

## 2024-09-30 PROCEDURE — 99285 EMERGENCY DEPT VISIT HI MDM: CPT

## 2024-09-30 PROCEDURE — 71275 CT ANGIOGRAPHY CHEST: CPT

## 2024-09-30 PROCEDURE — 71045 X-RAY EXAM CHEST 1 VIEW: CPT

## 2024-09-30 PROCEDURE — 93005 ELECTROCARDIOGRAM TRACING: CPT | Performed by: EMERGENCY MEDICINE

## 2024-09-30 PROCEDURE — 85379 FIBRIN DEGRADATION QUANT: CPT

## 2024-09-30 PROCEDURE — 80053 COMPREHEN METABOLIC PANEL: CPT

## 2024-09-30 RX ORDER — NAPROXEN 500 MG/1
500 TABLET ORAL 2 TIMES DAILY PRN
Qty: 20 TABLET | Refills: 0 | Status: SHIPPED | OUTPATIENT
Start: 2024-09-30

## 2024-09-30 RX ORDER — IOPAMIDOL 755 MG/ML
75 INJECTION, SOLUTION INTRAVASCULAR
Status: COMPLETED | OUTPATIENT
Start: 2024-09-30 | End: 2024-09-30

## 2024-09-30 RX ORDER — ASPIRIN 81 MG/1
324 TABLET, CHEWABLE ORAL ONCE
Status: COMPLETED | OUTPATIENT
Start: 2024-09-30 | End: 2024-09-30

## 2024-09-30 RX ADMIN — ASPIRIN 324 MG: 81 TABLET, CHEWABLE ORAL at 10:39

## 2024-09-30 RX ADMIN — IOPAMIDOL 75 ML: 755 INJECTION, SOLUTION INTRAVENOUS at 11:58

## 2024-09-30 ASSESSMENT — PAIN - FUNCTIONAL ASSESSMENT: PAIN_FUNCTIONAL_ASSESSMENT: 0-10

## 2024-09-30 ASSESSMENT — PAIN SCALES - GENERAL
PAINLEVEL_OUTOF10: 2
PAINLEVEL_OUTOF10: 0
PAINLEVEL_OUTOF10: 0

## 2024-09-30 ASSESSMENT — PAIN DESCRIPTION - LOCATION: LOCATION: CHEST

## 2024-09-30 NOTE — ED NOTES
Medication History  [facility]    Patient Name: Sergio Barrientos 2000     Medication history has been completed by: Cheyenne Arredondo CPhT    Source(s) of information: patient      Primary Care Physician: No primary care provider on file.     Pharmacy: [facility]     Allergies as of 09/30/2024    (No Known Allergies)        Prior to Admission medications    Medication Sig Start Date End Date Taking? Authorizing Provider     Medications removed from list (include reason, ex. noncompliance, medication cost, therapy complete etc.):   Albuterol inhaler not using  IBU prescription not taking  Indomethacin not taking  Potassium not taking    Comments:  Patient reports he takes no routine medications.    To my knowledge the above medication history is accurate as of 9/30/2024 12:30 PM.   Cheyenne Arredondo CPhT   9/30/2024 12:30 PM

## 2024-09-30 NOTE — ED PROVIDER NOTES
significant psychosocial, pericarditis, ACS, acute intrathoracic emergency.  Symptoms ongoing times months, patient is a smoker.  Patient and mother concerned with negative workup, will give cardiology follow-up.  Instructed follow-up primary care and cardiology, return for any new or worsening symptoms return for recheck in 24 to 48 hours if not feeling improvement    Disposition Considerations (tests considered but not done, Admit vs D/C, Shared Decision Making, Pt Expectation of Test or Tx.):        I am the Primary Clinician of Record.  FINAL IMPRESSION      1. Pleuritic chest pain          DISPOSITION/PLAN     DISPOSITION Decision To Discharge 09/30/2024 01:12:59 PM  Condition at Disposition: Data Unavailable      PATIENT REFERRED TO:  Dada Duran MD  [address] [address]  [phone]    In 3 days  Cardiology follow-up.  Return for any new or worsening symptoms      DISCHARGE MEDICATIONS:  New Prescriptions    NAPROXEN (NAPROSYN) 500 MG TABLET    Take 1 tablet by mouth 2 times daily as needed for Pain       DISCONTINUED MEDICATIONS:  Discontinued Medications    ALBUTEROL SULFATE HFA (PROVENTIL HFA) 108 (90 BASE) MCG/ACT INHALER    Inhale 2 puffs into the lungs every 6 hours as needed for Wheezing or Shortness of Breath    IBUPROFEN (ADVIL;MOTRIN) 600 MG TABLET    Take 1 tablet by mouth every 6 hours as needed for Pain    INDOMETHACIN (INDOCIN) 50 MG CAPSULE    Take 1 capsule by mouth 3 times daily for 14 days    POTASSIUM CHLORIDE (KLOR-CON M) 20 MEQ EXTENDED RELEASE TABLET    Take 1 tablet by mouth 2 times daily for 5 days              (Please note that portions of this note were completed with a voice recognition program.  Efforts were made to edit the dictations but occasionally words are mis-transcribed.)    Shahzad Childers PA-C (electronically signed)            Sahhzad Childers PA-C  09/30/24 [id number]